# Patient Record
Sex: MALE | Race: WHITE
[De-identification: names, ages, dates, MRNs, and addresses within clinical notes are randomized per-mention and may not be internally consistent; named-entity substitution may affect disease eponyms.]

---

## 2020-06-22 ENCOUNTER — HOSPITAL ENCOUNTER (INPATIENT)
Dept: HOSPITAL 11 - JP.ED | Age: 55
LOS: 4 days | Discharge: HOME | DRG: 192 | End: 2020-06-26
Attending: INTERNAL MEDICINE | Admitting: INTERNAL MEDICINE
Payer: MEDICAID

## 2020-06-22 DIAGNOSIS — Z79.51: ICD-10-CM

## 2020-06-22 DIAGNOSIS — F17.210: ICD-10-CM

## 2020-06-22 DIAGNOSIS — Z71.6: ICD-10-CM

## 2020-06-22 DIAGNOSIS — J44.1: Primary | ICD-10-CM

## 2020-06-22 RX ADMIN — METHYLPREDNISOLONE SODIUM SUCCINATE SCH MG: 40 INJECTION, POWDER, FOR SOLUTION INTRAMUSCULAR; INTRAVENOUS at 17:48

## 2020-06-22 RX ADMIN — METHYLPREDNISOLONE SODIUM SUCCINATE SCH MG: 40 INJECTION, POWDER, FOR SOLUTION INTRAMUSCULAR; INTRAVENOUS at 11:30

## 2020-06-22 NOTE — CR
CHEST: Portable 6/22/2020 at 634

 

CLINICAL HISTORY:SOB

 

COMPARISON:None

 

FINDINGS:  The lungs are emphysematous. The heart size, pulmonary vascularity

and hilar structures are normal. No infiltrate effusion or pneumothorax is seen.

 

IMPRESSION: No acute cardiopulmonary process.

 

The [facility] changes

## 2020-06-22 NOTE — PCM.HP.2
H&P History of Present Illness





- General


Date of Service: 06/22/20


Admit Problem/Dx: 


                           Admission Diagnosis/Problem





Admission Diagnosis/Problem      COPD, Severe chronic obstructive pulmonary


                                 disease








Source of Information: Patient, Provider, RN Notes Reviewed


History Limitations: Reports: No Limitations





- History of Present Illness


Initial Comments - Free Text/Narative: 





Mr. Rico is a 55-year-old gentleman who was admitted through the emergency 

department with shortness of breath and hypoxia secondary to COPD exacerbation. 

He reports that he has had a lifelong history of asthma and does use an inhaler 

intermittently.  He has a 40-pack-year smoking history and smoked up until the 

day prior to admission.  He does find that he has to pace himself somewhat as 

far as physical activity because of shortness of breath, but is fairly 

physically active working as a lyle.  He does not use home oxygen.  He felt

well until 2 days ago and over the past 36 hours has developed progressive 

shortness of breath.  He presented to the emergency department early this 

morning with increased respiratory rate and hypoxia.  He has received nebulizer 

therapy and is feeling somewhat improved, adequate oxygenation on 1 L of oxygen 

via nasal cannula.  He denies any fevers chills sweats or productive cough.





- Related Data


Allergies/Adverse Reactions: 


                                    Allergies











Allergy/AdvReac Type Severity Reaction Status Date / Time


 


No Known Allergies Allergy   Verified 06/22/20 06:14











Home Medications: 


                                    Home Meds





Albuterol [Ventolin HFA] 1 - 2 puff .XX Q4H PRN 06/22/20 [History]











Past Medical History


Respiratory History: Reports: Asthma





- Past Surgical History


GI Surgical History: Reports: Hernia, Inguinal





Social & Family History





- Tobacco Use


Smoking Status *Q: Current Every Day Smoker


Years of Tobacco use: 27


Packs/Tins Daily: 1





- Recreational Drug Use


Recreational Drug Use: No





H&P Review of Systems





- Review of Systems:


Review Of Systems: See Below


General: Reports: No Symptoms


HEENT: Reports: No Symptoms


Pulmonary: Reports: Shortness of Breath, Wheezing.  Denies: Pleuritic Chest 

Pain, Cough, Sputum, Hemoptysis


Cardiovascular: Reports: Dyspnea on Exertion.  Denies: Chest Pain, Palpitations,

 Orthopnea, PND, Edema, Lightheadedness


Gastrointestinal: Reports: No Symptoms


Genitourinary: Reports: No Symptoms


Musculoskeletal: Reports: No Symptoms


Skin: Reports: No Symptoms


Psychiatric: Reports: No Symptoms


Neurological: Reports: No Symptoms


Hematologic/Lymphatic: Reports: No Symptoms


Immunologic: Reports: No Symptoms





Exam





- Exam


Exam: See Below





- Vital Signs


Vital Signs: 


                                Last Vital Signs











Temp  95.9 F L  06/22/20 06:04


 


Pulse  64   06/22/20 09:30


 


Resp  20   06/22/20 09:30


 


BP  134/89   06/22/20 09:30


 


Pulse Ox  98   06/22/20 09:30











Weight: 148 lb





- Exam


Quality Assessment: Supplemental Oxygen, DVT Prophylaxis


General: Alert, Oriented, Cooperative, Moderate Distress


HEENT: Conjunctiva Clear, Hearing Intact, Mucosa Moist & Pink, Normal Nasal 

Septum, Posterior Pharynx Clear, Pupils Equal


Neck: Supple, Trachea Midline, +2 Carotid Pulse wo Bruit


Lungs: Decreased Breath Sounds, Wheezing.  No: Crackles, Rales, Rhonchi


Cardiovascular: Regular Rate, Regular Rhythm, Normal S1, Normal S2.  No: 

Systolic Murmur, Diastolic Murmur


GI/Abdominal Exam: Soft, Non-Tender, No Organomegaly, No Distention


Back Exam: Normal Inspection, Full Range of Motion


Extremities: Non-Tender, No Pedal Edema


Skin: Warm, Dry, Intact, Other (Lipoma right posterior shoulder)


Neurological: Cranial Nerves Intact, Strength Equal Bilateral, Normal Speech, 

Normal Tone, Sensation Intact.  No: Focal Deficit


Neuro Extensive - Mental Status: Alert, Oriented x3, Normal Mood/Affect, Normal 

Cognition, Memory Intact





- Patient Data


Lab Results Last 24 hrs: 


                         Laboratory Results - last 24 hr











  06/22/20 06/22/20 06/22/20 Range/Units





  06:10 06:10 06:10 


 


WBC  14.4 H    (4.5-11.0)  K/uL


 


RBC  4.99    (4.30-5.90)  M/uL


 


Hgb  15.2 H    (12.0-15.0)  g/dL


 


Hct  46.4    (40.0-54.0)  %


 


MCV  93    (80-98)  fL


 


MCH  31    (27-31)  pg


 


MCHC  33    (32-36)  %


 


Plt Count  383    (150-400)  K/uL


 


Puncture Site    R brachial  


 


ABG pH    7.341 L  (7.350-7.450)  


 


ABG pCO2    42.9 H  (35.0-42.0)  mmHg


 


ABG pO2    141.0 H  (75.0-100.0)  mmHg


 


ABG HCO3    22.5  (22.0-26.0)  mmol/L


 


ABG Total CO2    19.6 L  (23.0-27.0)  mmol/L


 


ABG O2 Saturation    98.4 H  (95.0-98.0)  %


 


ABG O2 Content    21.8  (15.0-23.0)  %vol


 


ABG Base Excess    -2.7  mm/L


 


ABG Hemoglobin    15.9  (13.5-18.0)  g/dL


 


ABG Oxyhemoglobin    96.9  %


 


ABG Carboxyhemoglobin    0.8  (0.0-1.6)  %


 


ABG Methemoglobin    0.7  %


 


O2 Delivery Device    Bipap  


 


Oxygen Flow Rate    8.0  L


 


Sodium   139 L   (140-148)  mmol/L


 


Potassium   4.5   (3.6-5.2)  mmol/L


 


Chloride   105   (100-108)  mmol/L


 


Carbon Dioxide   24   (21-32)  mmol/L


 


Anion Gap   14.5 H   (5.0-14.0)  mmol/L


 


BUN   13   (7-18)  mg/dL


 


Creatinine   0.9   (0.8-1.3)  mg/dL


 


Est Cr Clr Drug Dosing   90.13   mL/min


 


Estimated GFR (MDRD)   > 60   (>60)  


 


Glucose   126 H   ()  mg/dL


 


Calcium   9.0   (8.5-10.1)  mg/dL











Result Diagrams: 


                                 06/22/20 06:10





                                 06/22/20 06:10





Sepsis Event Note





- Evaluation


Sepsis Screening Result: No Definite Risk





- Focused Exam


Vital Signs: 


                                   Vital Signs











  Temp Pulse Resp BP Pulse Ox


 


 06/22/20 09:30   64  20  134/89  98


 


 06/22/20 07:30   87  22 H  135/95 H  99


 


 06/22/20 06:28   58 L  24 H  160/103 H  100


 


 06/22/20 06:04  95.9 F L  75  21 H  157/96 H  100











Date Exam was Performed: 06/22/20


Time Exam was Performed: 10:03





*Q Meaningful Use (ADM)





- VTE Risk Assess *Q


Each Risk Factor Represents 1 Point: Age 41 - 59 years, Abnormal Pulmonary 

Function (COPD)


Total Score 1 Point Risk Factors: 2


Each Risk Factor Represents 2 Points: None


Total Score 2 Point Risk Factors: 0


Each Risk Factor Represents 3 Points: None


Total Score 3 Point Risk Factors: 0


Each Risk Factor Represents 5 Points: None


Total Score 5 Point Risk Factors: 0


Venous Thromboembolism Risk Factor Score *Q: 2


Problem List Initiated/Reviewed/Updated: Yes


Orders Last 24hrs: 


                               Active Orders 24 hr











 Category Date Time Status


 


 Patient Status Manage Transfer [TRANSFER] Routine ADT  06/22/20 09:57 Ordered


 


 RT Aerosol Therapy [RC] ASDIRECTED Care  06/22/20 06:06 Active


 


 RT Aerosol Therapy [RC] ASDIRECTED Care  06/22/20 06:40 Active


 


 Sodium Chloride 0.9% [Saline Flush] Med  06/22/20 06:11 Active





 10 ml FLUSH ASDIRECTED PRN   


 


 Saline Lock Insert [OM.PC] Routine Oth  06/22/20 06:11 Ordered


 


 Resuscitation Status Routine Resus Stat  06/22/20 09:59 Ordered








                                Medication Orders





Sodium Chloride (Saline Flush)  10 ml FLUSH ASDIRECTED PRN


   PRN Reason: Keep Vein Open


   Last Admin: 06/22/20 06:16  Dose: 10 ml


   Documented by: JOSE








Assessment/Plan Comment:: 





ASSESSMENT AND PLAN





COPD EXACERBATION WITH HYPOXIA-longstanding history of reactive airway disease 

and 40-pack-year smoking history.  Shortness of breath over the last 36 hours, 

no evidence of underlying infection.


-Supplemental oxygen as needed


-Nebulized albuterol and DuoNebs


-Solu-Medrol 40 mg IV every 6 hours


-Hold on antibiotic therapy, unless he develops overt evidence of infection.





NICOTINE DEPENDENCE


-Nicotine patch


-Nicotine gum as needed





MAINTENANCE ISSUES


-DVT prophylaxis; Lovenox 40 mg subcu daily


-GI prophylaxis; not indicated


-English catheter; not indicated


-Nutrition; regular diet


-Nicotine dependence; nicotine patch and gum





CODE STATUS-FULL CODE





ADMISSION STATUS-patient will be admitted to inpatient status, expect at least a

 2 night hospital stay for evaluation and management of problems as outlined 

above.  At the time of this admission I do not reasonably expected evaluation 

and management of this problem will require more than a 96 hour hospital stay.





DISPOSITION-anticipate discharge to home after the hospital stay.





- Mortality Measure


Prognosis:: Good

## 2020-06-22 NOTE — EDM.PDOC
ED HPI GENERAL MEDICAL PROBLEM





- General


Chief Complaint: Respiratory Problem


Stated Complaint: MEDICAL VIA NORTH


Time Seen by Provider: 06/22/20 06:05


Source of Information: Reports: Patient, EMS


History Limitations: Reports: Other (no old records)





- History of Present Illness


INITIAL COMMENTS - FREE TEXT/NARRATIVE: 





54 yo male smoker is transported this morning to the ER via EMS for 

SOB/wheezing. A Duoneb and BiPAP was used per EMS en route with some benefit. 

Comes from the [facility]. Has no provider. No fever. Has been hospitalized in 

the past for his breathing in [place], [place]. Has had MDI inhalers in the 

past, but is out of them. 


Onset: Gradual


Onset Date: 06/20/20


Duration: Day(s): (2), Getting Worse


Location: Reports: Chest


Quality: Reports: Other (no pain)


Severity: Severe


Improves with: Reports: Medication


Worsens with: Reports: Other (time/smoking)


Context: Reports: Other (See HPI)


Associated Symptoms: Reports: Shortness of Breath.  Denies: Chest Pain, 

Fever/Chills


Treatments PTA: Reports: Other (see below) (Duoneb/BiPAP)





- Related Data


                                    Allergies











Allergy/AdvReac Type Severity Reaction Status Date / Time


 


No Known Allergies Allergy   Verified 06/22/20 06:14











Home Meds: 


                                    Home Meds





Albuterol [Ventolin HFA] 1 - 2 puff .XX Q4H PRN 06/22/20 [History]











ED ROS GENERAL





- Review of Systems


Review Of Systems: See Below


Constitutional: Reports: No Symptoms


HEENT: Reports: No Symptoms


Respiratory: Reports: Shortness of Breath, Wheezing


Cardiovascular: Reports: No Symptoms


GI/Abdominal: Reports: No Symptoms


: Reports: No Symptoms


Musculoskeletal: Reports: No Symptoms


Skin: Reports: No Symptoms


Neurological: Reports: No Symptoms





ED EXAM, GENERAL





- Physical Exam


Exam: See Below


Exam Limited By: No Limitations


General Appearance: Alert, WD/WN, Mild Distress


Eye Exam: Bilateral Eye: Normal Inspection


Ears: Normal External Exam, Normal Canal, Hearing Grossly Normal


Ear Exam: Bilateral Ear: Auricle Normal, Canal Normal


Nose: Normal Inspection, No Blood


Throat/Mouth: Normal Inspection, Normal Lips, Normal Oropharynx, Normal Voice, 

No Airway Compromise


Head: Atraumatic, Normocephalic


Neck: Normal Inspection


Respiratory/Chest: Respiratory Distress, Decreased Breath Sounds, Wheezing, 

Retractions.  No: No Respiratory Distress, Lungs Clear, Normal Breath Sounds, No

 Accessory Muscle Use, Rales, Rhonchi


Cardiovascular: Regular Rate, Rhythm, No Edema


GI/Abdominal: Normal Bowel Sounds, Soft, Non-Tender, No Distention


Back Exam: Normal Inspection.  No: CVA Tenderness (R), CVA Tenderness (L)


Extremities: Normal Inspection, Normal Range of Motion, Non-Tender, No Pedal 

Edema.  No: Pedal Edema


Neurological: Alert, Oriented, CN II-XII Intact, Normal Cognition, No Motor/

Sensory Deficits


Psychiatric: Normal Affect, Normal Mood


Skin Exam: Warm, Dry, Intact, Normal Color, No Rash





Course





- Vital Signs


Text/Narrative:: 





Dr. Kelley called @ 0702h


Last Recorded V/S: 


                                Last Vital Signs











Temp  36.5 C   06/22/20 10:37


 


Pulse  84   06/22/20 16:00


 


Resp  20   06/22/20 16:00


 


BP  127/73   06/22/20 16:00


 


Pulse Ox  99   06/22/20 16:00














- Orders/Labs/Meds


Orders: 


                                Medication Orders





Acetaminophen (Tylenol)  650 mg PO Q4H PRN


   PRN Reason: Pain (Mild 1-3)/fever


Albuterol (Proventil Neb Soln)  2.5 mg NEB Q2H PRN


   PRN Reason: Shortness Of Breath/wheezing


Albuterol/Ipratropium (Duoneb 3.0-0.5 Mg/3 Ml)  3 ml NEB QIDRT [facility]


   Last Admin: 06/22/20 14:46  Dose: 3 ml


   Documented by: NEGAR


   Admin: 06/22/20 10:51  Dose: 3 ml


   Documented by: RAIMUNDO


Enoxaparin Sodium (Lovenox)  40 mg SUBCUT Q24H [facility]


   Last Admin: 06/22/20 11:27  Dose: 40 mg


   Documented by: PJ


Sodium Chloride (Normal Saline)  1,000 mls @ 125 mls/hr IV ASDIRECTED [facility]


   Last Admin: 06/22/20 10:44  Dose: 125 mls/hr


   Documented by: PJ


Methylprednisolone Sodium Succinate (Solu-Medrol)  40 mg IVPUSH Q6H [facility]


   Last Admin: 06/22/20 17:48  Dose: 40 mg


   Documented by: PJ


   Admin: 06/22/20 11:30  Dose: 40 mg


   Documented by: PJ


Nicotine (Habitrol)  21 mg TRDERM DAILY DARWIN


   Last Admin: 06/22/20 11:28  Dose: 21 mg


   Documented by: PJ


Nicotine Polacrilex (Nicorelief)  2 mg CHEW Q1H PRN


   PRN Reason: Other


Ondansetron HCl (Zofran)  4 mg IV Q4H PRN


   PRN Reason: Nausea/Vomiting


Polyethylene Glycol (Miralax)  17 gm PO DAILY PRN


   PRN Reason: Constipation


Sodium Chloride (Saline Flush)  10 ml FLUSH ASDIRECTED PRN


   PRN Reason: Keep Vein Open








Labs: 


                                Laboratory Tests











  06/22/20 06/22/20 06/22/20 Range/Units





  06:10 06:10 06:10 


 


WBC  14.4 H    (4.5-11.0)  K/uL


 


RBC  4.99    (4.30-5.90)  M/uL


 


Hgb  15.2 H    (12.0-15.0)  g/dL


 


Hct  46.4    (40.0-54.0)  %


 


MCV  93    (80-98)  fL


 


MCH  31    (27-31)  pg


 


MCHC  33    (32-36)  %


 


Plt Count  383    (150-400)  K/uL


 


Puncture Site    R brachial  


 


ABG pH    7.341 L  (7.350-7.450)  


 


ABG pCO2    42.9 H  (35.0-42.0)  mmHg


 


ABG pO2    141.0 H  (75.0-100.0)  mmHg


 


ABG HCO3    22.5  (22.0-26.0)  mmol/L


 


ABG Total CO2    19.6 L  (23.0-27.0)  mmol/L


 


ABG O2 Saturation    98.4 H  (95.0-98.0)  %


 


ABG O2 Content    21.8  (15.0-23.0)  %vol


 


ABG Base Excess    -2.7  mm/L


 


ABG Hemoglobin    15.9  (13.5-18.0)  g/dL


 


ABG Oxyhemoglobin    96.9  %


 


ABG Carboxyhemoglobin    0.8  (0.0-1.6)  %


 


ABG Methemoglobin    0.7  %


 


O2 Delivery Device    Bipap  


 


Oxygen Flow Rate    8.0  L


 


Sodium   139 L   (140-148)  mmol/L


 


Potassium   4.5   (3.6-5.2)  mmol/L


 


Chloride   105   (100-108)  mmol/L


 


Carbon Dioxide   24   (21-32)  mmol/L


 


Anion Gap   14.5 H   (5.0-14.0)  mmol/L


 


BUN   13   (7-18)  mg/dL


 


Creatinine   0.9   (0.8-1.3)  mg/dL


 


Est Cr Clr Drug Dosing   90.13   mL/min


 


Estimated GFR (MDRD)   > 60   (>60)  


 


Glucose   126 H   ()  mg/dL


 


Calcium   9.0   (8.5-10.1)  mg/dL











Meds: 


Medications











Generic Name Dose Route Start Last Admin





  Trade Name Freq  PRN Reason Stop Dose Admin


 


Acetaminophen  650 mg  06/22/20 10:37 





  Tylenol  PO  





  Q4H PRN  





  Pain (Mild 1-3)/fever  


 


Albuterol  2.5 mg  06/22/20 10:37 





  Proventil Neb Soln  NEB  





  Q2H PRN  





  Shortness Of Breath/wheezing  


 


Albuterol/Ipratropium  3 ml  06/22/20 11:00  06/22/20 14:46





  Duoneb 3.0-0.5 Mg/3 Ml  NEB   3 ml





  QIDRT DARWIN   Administration


 


Enoxaparin Sodium  40 mg  06/22/20 12:00  06/22/20 11:27





  Lovenox  SUBCUT   40 mg





  Q24H DARWIN   Administration


 


Sodium Chloride  1,000 mls @ 125 mls/hr  06/22/20 10:37  06/22/20 10:44





  Normal Saline  IV   125 mls/hr





  ASDIRECTED DARWIN   Administration


 


Methylprednisolone Sodium Succinate  40 mg  06/22/20 12:00  06/22/20 17:48





  Solu-Medrol  IVPUSH   40 mg





  Q6H DARWIN   Administration


 


Nicotine  21 mg  06/22/20 11:00  06/22/20 11:28





  Habitrol  TRDERM   21 mg





  DAILY DARWIN   Administration


 


Nicotine Polacrilex  2 mg  06/22/20 10:37 





  Nicorelief  CHEW  





  Q1H PRN  





  Other  


 


Ondansetron HCl  4 mg  06/22/20 10:37 





  Zofran  IV  





  Q4H PRN  





  Nausea/Vomiting  


 


Polyethylene Glycol  17 gm  06/22/20 10:37 





  Miralax  PO  





  DAILY PRN  





  Constipation  


 


Sodium Chloride  10 ml  06/22/20 10:37 





  Saline Flush  FLUSH  





  ASDIRECTED PRN  





  Keep Vein Open  














Discontinued Medications














Generic Name Dose Route Start Last Admin





  Trade Name Freq  PRN Reason Stop Dose Admin


 


Albuterol  2.5 mg  06/22/20 06:06  06/22/20 06:13





  Proventil Neb Soln  NEB  06/22/20 06:07  2.5 mg





  ONETIME ONE   Administration


 


Albuterol  2.5 mg  06/22/20 06:40  06/22/20 06:44





  Proventil Neb Soln  NEB  06/22/20 06:41  2.5 mg





  ONETIME ONE   Administration


 


Methylprednisolone Sodium Succinate  125 mg  06/22/20 06:12  06/22/20 06:16





  Solu-Medrol  IVPUSH  06/22/20 06:13  125 mg





  ONETIME ONE   Administration


 


Sodium Chloride  10 ml  06/22/20 06:11  06/22/20 06:16





  Saline Flush  FLUSH   10 ml





  ASDIRECTED PRN   Administration





  Keep Vein Open  














- Radiology Interpretation


Free Text/Narrative:: 





CXR-emphysema





Departure





- Departure


Time of Disposition: 08:00


Disposition: Admitted As Inpatient 68


Condition: Fair


Clinical Impression: 


 Bronchospasm





Emphysema lung


Qualifiers:


 Emphysema type: unspecified Qualified Code(s): J43.9 - Emphysema, unspecified








- Discharge Information


*PRESCRIPTION DRUG MONITORING PROGRAM REVIEWED*: Not Applicable


*COPY OF PRESCRIPTION DRUG MONITORING REPORT IN PATIENT JOSE ALFREDO: Not Applicable





Sepsis Event Note (ED)





- Focused Exam


Vital Signs: 


                                   Vital Signs











  Temp Pulse Resp BP Pulse Ox


 


 06/22/20 09:30   64  20  134/89  98


 


 06/22/20 07:30   87  22 H  135/95 H  99


 


 06/22/20 06:28   58 L  24 H  160/103 H  100


 


 06/22/20 06:04  35.5 C L  75  21 H  157/96 H  100

## 2020-06-23 RX ADMIN — ALBUTEROL SULFATE PRN MG: 2.5 SOLUTION RESPIRATORY (INHALATION) at 04:46

## 2020-06-23 RX ADMIN — METHYLPREDNISOLONE SODIUM SUCCINATE SCH MG: 40 INJECTION, POWDER, FOR SOLUTION INTRAMUSCULAR; INTRAVENOUS at 00:38

## 2020-06-23 RX ADMIN — METHYLPREDNISOLONE SODIUM SUCCINATE SCH MG: 40 INJECTION, POWDER, FOR SOLUTION INTRAMUSCULAR; INTRAVENOUS at 17:09

## 2020-06-23 RX ADMIN — METHYLPREDNISOLONE SODIUM SUCCINATE SCH MG: 40 INJECTION, POWDER, FOR SOLUTION INTRAMUSCULAR; INTRAVENOUS at 11:32

## 2020-06-23 RX ADMIN — METHYLPREDNISOLONE SODIUM SUCCINATE SCH MG: 40 INJECTION, POWDER, FOR SOLUTION INTRAMUSCULAR; INTRAVENOUS at 06:08

## 2020-06-23 NOTE — PCM.PN
- General Info


Date of Service: 06/23/20


Subjective Update: 





Mr. Rico is noted moderate improvement in his shortness of breath since 

admission.  At rest he is much less short of breath and more comfortable.  He 

does desaturate and become short of breath with minimal exertion.  He is 

remained afebrile and hemodynamically stable, no cough or other evidence of 

underlying infection.


Functional Status: Reports: Tolerating Diet, Urinating





- Review of Systems


General: Reports: No Symptoms


Pulmonary: Reports: Shortness of Breath, Wheezing.  Denies: Pleuritic Chest 

Pain, Cough, Sputum, Hemoptysis


Cardiovascular: Reports: Dyspnea on Exertion.  Denies: Chest Pain, Palpitations,

Orthopnea, PND, Edema, Lightheadedness


Gastrointestinal: Reports: No Symptoms





- Patient Data


Vitals - Most Recent: 


                                Last Vital Signs











Temp  98.2 F   06/23/20 08:00


 


Pulse  93   06/23/20 10:00


 


Resp  18   06/23/20 10:00


 


BP  114/60   06/23/20 10:00


 


Pulse Ox  92 L  06/23/20 10:00











Weight - Most Recent: 148 lb 0.011 oz


I&O - Last 24 Hours: 


                                 Intake & Output











 06/22/20 06/23/20 06/23/20





 22:59 06:59 14:59


 


Intake Total 1269 1348 250


 


Output Total 450 900 450


 


Balance 819 448 -200











Med Orders - Current: 


                               Current Medications





Acetaminophen (Tylenol)  650 mg PO Q4H PRN


   PRN Reason: Pain (Mild 1-3)/fever


Albuterol (Proventil Neb Soln)  2.5 mg NEB Q2H PRN


   PRN Reason: Shortness Of Breath/wheezing


   Last Admin: 06/23/20 04:46 Dose:  2.5 mg


   Documented by: 


Albuterol/Ipratropium (Duoneb 3.0-0.5 Mg/3 Ml)  3 ml NEB QIDRT [facility]


   Last Admin: 06/23/20 07:25 Dose:  3 ml


   Documented by: 


Enoxaparin Sodium (Lovenox)  40 mg SUBCUT Q24H [facility]


   Last Admin: 06/22/20 11:27 Dose:  40 mg


   Documented by: 


Methylprednisolone Sodium Succinate (Solu-Medrol)  40 mg IVPUSH Q6H [facility]


   Last Admin: 06/23/20 06:08 Dose:  40 mg


   Documented by: 


Nicotine (Habitrol)  21 mg TRDERM DAILY [facility]


   Last Admin: 06/23/20 08:56 Dose:  21 mg


   Documented by: 


Nicotine Polacrilex (Nicorelief)  2 mg CHEW Q1H PRN


   PRN Reason: Other


Ondansetron HCl (Zofran)  4 mg IV Q4H PRN


   PRN Reason: Nausea/Vomiting


Polyethylene Glycol (Miralax)  17 gm PO DAILY PRN


   PRN Reason: Constipation


Sodium Chloride (Saline Flush)  10 ml FLUSH ASDIRECTED PRN


   PRN Reason: Keep Vein Open





Discontinued Medications





Albuterol (Proventil Neb Soln)  2.5 mg NEB ONETIME ONE


   Stop: 06/22/20 06:07


   Last Admin: 06/22/20 06:13 Dose:  2.5 mg


   Documented by: 


Albuterol (Proventil Neb Soln)  2.5 mg NEB ONETIME ONE


   Stop: 06/22/20 06:41


   Last Admin: 06/22/20 06:44 Dose:  2.5 mg


   Documented by: 


Sodium Chloride (Normal Saline)  1,000 mls @ 125 mls/hr IV ASDIRECTED [facility]


   Last Admin: 06/23/20 02:20 Dose:  125 mls/hr


   Documented by: 


Methylprednisolone Sodium Succinate (Solu-Medrol)  125 mg IVPUSH ONETIME ONE


   Stop: 06/22/20 06:13


   Last Admin: 06/22/20 06:16 Dose:  125 mg


   Documented by: 


Sodium Chloride (Saline Flush)  10 ml FLUSH ASDIRECTED PRN


   PRN Reason: Keep Vein Open


   Last Admin: 06/22/20 06:16 Dose:  10 ml


   Documented by: 











- Exam


Quality Assessment: DVT Prophylaxis


General: Alert, Oriented, Cooperative, Moderate Distress


Lungs: Decreased Breath Sounds, Wheezing.  No: Crackles, Rales, Rhonchi


Cardiovascular: Regular Rate, Regular Rhythm, No Murmurs


GI/Abdominal Exam: Soft, Non-Tender, No Organomegaly, No Distention


Extremities: Non-Tender, No Pedal Edema





Sepsis Event Note





- Evaluation


Sepsis Screening Result: No Definite Risk





- Focused Exam


Vital Signs: 


                                   Vital Signs











  Temp Pulse Resp BP Pulse Ox Pulse Ox


 


 06/23/20 10:00   93  18  114/60  92 L 


 


 06/23/20 08:00  98.2 F  100  20  127/66  94 L 


 


 06/23/20 07:25   105 H     95


 


 06/23/20 07:00      96 


 


 06/23/20 06:00   81  15  125/63  96 


 


 06/23/20 04:00   76  16  112/73  96 


 


 06/23/20 02:00   92  16  116/65  91 L 


 


 06/23/20 00:00   89  18  125/75  95 











Date Exam was Performed: 06/23/20


Time Exam was Performed: 10:29





- Problem List Review


Problem List Initiated/Reviewed/Updated: Yes





- My Orders


Last 24 Hours: 


My Active Orders





06/22/20 09:59


Resuscitation Status Routine 





06/22/20 10:37


Acetaminophen [Tylenol]   650 mg PO Q4H PRN 


Albuterol [Proventil Neb Soln]   2.5 mg NEB Q2H PRN 


Nicotine Polacrilex [Nicorelief]   2 mg CHEW Q1H PRN 


Ondansetron [Zofran]   4 mg IV Q4H PRN 


Sodium Chloride 0.9% [Saline Flush]   10 ml FLUSH ASDIRECTED PRN 


polyethylene glycoL 3350 [MiraLAX]   17 gm PO DAILY PRN 





06/22/20 10:37


Patient Status [ADT] Routine 


Ambulate [RC] QID 


Height and Weight [RC] DAILY 


Intake and Output [RC] QSHIFT 


Notify Provider Vital Signs [RC] ASDIRECTED 


Oxygen Therapy [RC] PRN 


Peripheral IV Care [RC] Q12H 


Pulse Oximetry [RC] CONTINUOUS 


RT Aerosol Therapy [RC] ASDIRECTED 


Up ad Pilar [RC] ASDIRECTED 


Up to Chair [RC] QID 


VTE/DVT Education [RC] Per Unit Routine 


Vital Signs [RC] Q2H 


Peripheral IV Insertion Adult [OM.PC] Routine 





06/22/20 11:00


Albuterol/Ipratropium [DuoNeb 3.0-0.5 MG/3 ML]   3 ml NEB QIDRT 


Nicotine [Habitrol]   21 mg TRDERM DAILY 





06/22/20 12:00


Enoxaparin [Lovenox]   40 mg SUBCUT Q24H 


methylPREDNISolone Sod Succ [Solu-MEDROL]   40 mg IVPUSH Q6H 





06/23/20 10:28


Convert IV to Saline Lock [OM.PC] Routine 














- Plan


Plan:: 





ASSESSMENT AND PLAN





COPD EXACERBATION WITH HYPOXIA-longstanding history of reactive airway disease 

and 40-pack-year smoking history.  Moderate improvement from admission, still 

short of breath and hypoxic with activity


-Supplemental oxygen as needed


-Nebulized albuterol and DuoNebs


-Solu-Medrol 40 mg IV every 6 hours


-Hold on antibiotic therapy, unless he develops evidence of infection.





NICOTINE DEPENDENCE


-Nicotine patch


-Nicotine gum as needed





MAINTENANCE ISSUES


-DVT prophylaxis; Lovenox 40 mg subcu daily


-GI prophylaxis; not indicated


-English catheter; not indicated


-Nutrition; regular diet


-Nicotine dependence; nicotine patch and gum





CODE STATUS-FULL CODE





ADMISSION STATUS-patient will be admitted to inpatient status, expect at least a

 2 night hospital stay for evaluation and management of problems as outlined 

above.  At the time of this admission I do not reasonably expected evaluation 

and management of this problem will require more than a 96 hour hospital stay.





DISPOSITION-anticipate discharge to home after the hospital stay.

## 2020-06-24 RX ADMIN — METHYLPREDNISOLONE SODIUM SUCCINATE SCH MG: 40 INJECTION, POWDER, FOR SOLUTION INTRAMUSCULAR; INTRAVENOUS at 13:05

## 2020-06-24 RX ADMIN — METHYLPREDNISOLONE SODIUM SUCCINATE SCH MG: 40 INJECTION, POWDER, FOR SOLUTION INTRAMUSCULAR; INTRAVENOUS at 17:27

## 2020-06-24 RX ADMIN — ALBUTEROL SULFATE PRN MG: 2.5 SOLUTION RESPIRATORY (INHALATION) at 05:28

## 2020-06-24 RX ADMIN — METHYLPREDNISOLONE SODIUM SUCCINATE SCH MG: 40 INJECTION, POWDER, FOR SOLUTION INTRAMUSCULAR; INTRAVENOUS at 01:50

## 2020-06-24 RX ADMIN — METHYLPREDNISOLONE SODIUM SUCCINATE SCH MG: 40 INJECTION, POWDER, FOR SOLUTION INTRAMUSCULAR; INTRAVENOUS at 05:29

## 2020-06-24 NOTE — PCM.PN
- General Info


Date of Service: 06/24/20


Subjective Update: 





Mr. Rico has noted further modest improvement in his shortness of breath 

over the last 24 hours.  Continues to experience a nonproductive cough.  Vital 

signs have remained stable and he has been afebrile.  Continues to require 

supplemental oxygen at 1 L/min via nasal cannula.


Functional Status: Reports: Tolerating Diet, Ambulating, Urinating





- Review of Systems


General: Reports: No Symptoms


Pulmonary: Reports: Shortness of Breath, Cough, Wheezing.  Denies: Pleuritic 

Chest Pain, Sputum, Hemoptysis


Cardiovascular: Reports: Dyspnea on Exertion.  Denies: Chest Pain, Palpitations,

Orthopnea, PND, Edema, Lightheadedness


Gastrointestinal: Reports: No Symptoms





- Patient Data


Vitals - Most Recent: 


                                Last Vital Signs











Temp  97.5 F   06/24/20 11:00


 


Pulse  90   06/24/20 11:04


 


Resp  18   06/24/20 11:00


 


BP  106/52 L  06/24/20 11:00


 


Pulse Ox  92 L  06/24/20 11:00











Weight - Most Recent: 134 lb 3.2 oz


I&O - Last 24 Hours: 


                                 Intake & Output











 06/23/20 06/24/20 06/24/20





 22:59 06:59 14:59


 


Intake Total 240 360 


 


Balance 240 360 











Med Orders - Current: 


                               Current Medications





Acetaminophen (Tylenol)  650 mg PO Q4H PRN


   PRN Reason: Pain (Mild 1-3)/fever


Albuterol (Proventil Neb Soln)  2.5 mg NEB Q2H PRN


   PRN Reason: Shortness Of Breath/wheezing


   Last Admin: 06/24/20 05:28 Dose:  2.5 mg


   Documented by: 


Albuterol/Ipratropium (Duoneb 3.0-0.5 Mg/3 Ml)  3 ml NEB QIDRT [facility]


   Last Admin: 06/24/20 11:04 Dose:  3 ml


   Documented by: 


Enoxaparin Sodium (Lovenox)  40 mg SUBCUT Q24H [facility]


   Last Admin: 06/23/20 11:32 Dose:  40 mg


   Documented by: 


Methylprednisolone Sodium Succinate (Solu-Medrol)  40 mg IVPUSH Q6H [facility]


   Last Admin: 06/24/20 05:29 Dose:  40 mg


   Documented by: 


Nicotine (Habitrol)  21 mg TRDERM DAILY [facility]


   Last Admin: 06/24/20 09:15 Dose:  21 mg


   Documented by: 


Nicotine Polacrilex (Nicorelief)  2 mg CHEW Q1H PRN


   PRN Reason: Other


Ondansetron HCl (Zofran)  4 mg IV Q4H PRN


   PRN Reason: Nausea/Vomiting


Polyethylene Glycol (Miralax)  17 gm PO DAILY PRN


   PRN Reason: Constipation


Sodium Chloride (Saline Flush)  10 ml FLUSH ASDIRECTED PRN


   PRN Reason: Keep Vein Open





Discontinued Medications





Albuterol (Proventil Neb Soln)  2.5 mg NEB ONETIME ONE


   Stop: 06/22/20 06:07


   Last Admin: 06/22/20 06:13 Dose:  2.5 mg


   Documented by: 


Albuterol (Proventil Neb Soln)  2.5 mg NEB ONETIME ONE


   Stop: 06/22/20 06:41


   Last Admin: 06/22/20 06:44 Dose:  2.5 mg


   Documented by: 


Sodium Chloride (Normal Saline)  1,000 mls @ 125 mls/hr IV ASDIRECTED [facility]


   Last Admin: 06/23/20 02:20 Dose:  125 mls/hr


   Documented by: 


Methylprednisolone Sodium Succinate (Solu-Medrol)  125 mg IVPUSH ONETIME ONE


   Stop: 06/22/20 06:13


   Last Admin: 06/22/20 06:16 Dose:  125 mg


   Documented by: 


Sodium Chloride (Saline Flush)  10 ml FLUSH ASDIRECTED PRN


   PRN Reason: Keep Vein Open


   Last Admin: 06/22/20 06:16 Dose:  10 ml


   Documented by: 











- Exam


Quality Assessment: Supplemental Oxygen, DVT Prophylaxis


General: Alert, Oriented, Cooperative, Moderate Distress


Lungs: Decreased Breath Sounds, Wheezing.  No: Crackles, Rales, Rhonchi


Cardiovascular: Regular Rate, Regular Rhythm.  No: No Murmurs


GI/Abdominal Exam: Soft, Non-Tender, No Organomegaly, No Distention


Extremities: Non-Tender, No Pedal Edema





Sepsis Event Note





- Evaluation


Sepsis Screening Result: No Definite Risk





- Focused Exam


Vital Signs: 


                                   Vital Signs











  Temp Pulse Resp BP Pulse Ox


 


 06/24/20 11:04   90   


 


 06/24/20 11:00  97.5 F  96  18  106/52 L  92 L


 


 06/24/20 07:47      91 L


 


 06/24/20 07:33  96.9 F  97  16  107/63  96


 


 06/24/20 07:26   72   


 


 06/24/20 03:00  97.5 F  67  18  121/61  95


 


 06/24/20 01:40      94 L











Date Exam was Performed: 06/24/20


Time Exam was Performed: 12:21





- Problem List Review


Problem List Initiated/Reviewed/Updated: Yes





- Plan


Plan:: 





ASSESSMENT AND PLAN





COPD EXACERBATION WITH HYPOXIA-longstanding history of reactive airway disease 

and 40-pack-year smoking history.  Moderate improvement from admission, still 

short of breath and hypoxic with activity


-Supplemental oxygen as needed


-Nebulized albuterol and DuoNebs


-Solu-Medrol 40 mg IV every 6 hours


-Hold on antibiotic therapy, unless he develops evidence of infection.





NICOTINE DEPENDENCE


-Nicotine patch


-Nicotine gum as needed





MAINTENANCE ISSUES


-DVT prophylaxis; Lovenox 40 mg subcu daily


-GI prophylaxis; not indicated


-English catheter; not indicated


-Nutrition; regular diet


-Nicotine dependence; nicotine patch and gum





CODE STATUS-FULL CODE





ADMISSION STATUS-patient will be admitted to inpatient status, expect at least a

2 night hospital stay for evaluation and management of problems as outlined 

above.  At the time of this admission I do not reasonably expected evaluation 

and management of this problem will require more than a 96 hour hospital stay.





DISPOSITION-anticipate discharge to home after the hospital stay.

## 2020-06-25 RX ADMIN — METHYLPREDNISOLONE SODIUM SUCCINATE SCH MG: 40 INJECTION, POWDER, FOR SOLUTION INTRAMUSCULAR; INTRAVENOUS at 11:24

## 2020-06-25 RX ADMIN — METHYLPREDNISOLONE SODIUM SUCCINATE SCH MG: 40 INJECTION, POWDER, FOR SOLUTION INTRAMUSCULAR; INTRAVENOUS at 00:33

## 2020-06-25 RX ADMIN — METHYLPREDNISOLONE SODIUM SUCCINATE SCH MG: 40 INJECTION, POWDER, FOR SOLUTION INTRAMUSCULAR; INTRAVENOUS at 21:09

## 2020-06-25 RX ADMIN — METHYLPREDNISOLONE SODIUM SUCCINATE SCH MG: 40 INJECTION, POWDER, FOR SOLUTION INTRAMUSCULAR; INTRAVENOUS at 05:10

## 2020-06-25 NOTE — PCM.PN
- General Info


Date of Service: 06/25/20


Subjective Update: 





Mr. Rico has been stable since yesterday, further improvement in shortness 

of breath although he does get winded with fairly minimal activity.  He is now 

off of supplemental oxygen.  Continues to have cough intermittently productive 

of fairly clear sputum.


Functional Status: Reports: Tolerating Diet, Ambulating, Urinating





- Review of Systems


General: Denies: Fever, Chills


Pulmonary: Reports: Shortness of Breath.  Denies: Pleuritic Chest Pain, Cough, 

Sputum, Hemoptysis, Wheezing


Cardiovascular: Reports: Dyspnea on Exertion.  Denies: Chest Pain, Palpitations,

Orthopnea, PND, Edema, Lightheadedness


Gastrointestinal: Reports: No Symptoms





- Patient Data


Vitals - Most Recent: 


                                Last Vital Signs











Temp  98.2 F   06/25/20 11:29


 


Pulse  97   06/25/20 11:29


 


Resp  16   06/25/20 11:29


 


BP  128/65   06/25/20 11:29


 


Pulse Ox  93 L  06/25/20 11:29











Weight - Most Recent: 134 lb 3.2 oz


I&O - Last 24 Hours: 


                                 Intake & Output











 06/24/20 06/25/20 06/25/20





 22:59 06:59 14:59


 


Intake Total 1200 480 


 


Balance 1200 480 











Med Orders - Current: 


                               Current Medications





Acetaminophen (Tylenol)  650 mg PO Q4H PRN


   PRN Reason: Pain (Mild 1-3)/fever


   Last Admin: 06/25/20 11:27 Dose:  650 mg


   Documented by: 


Albuterol (Proventil Neb Soln)  2.5 mg NEB Q2H PRN


   PRN Reason: Shortness Of Breath/wheezing


   Last Admin: 06/24/20 05:28 Dose:  2.5 mg


   Documented by: 


Albuterol/Ipratropium (Duoneb 3.0-0.5 Mg/3 Ml)  3 ml NEB QIDRT [facility]


   Last Admin: 06/25/20 10:46 Dose:  3 ml


   Documented by: 


Enoxaparin Sodium (Lovenox)  40 mg SUBCUT Q24H [facility]


   Last Admin: 06/25/20 11:24 Dose:  40 mg


   Documented by: 


Nicotine (Habitrol)  21 mg TRDERM DAILY [facility]


   Last Admin: 06/25/20 09:22 Dose:  Not Given


   Documented by: 


Nicotine Polacrilex (Nicorelief)  2 mg CHEW Q1H PRN


   PRN Reason: Other


Ondansetron HCl (Zofran)  4 mg IV Q4H PRN


   PRN Reason: Nausea/Vomiting


Polyethylene Glycol (Miralax)  17 gm PO DAILY PRN


   PRN Reason: Constipation


Sodium Chloride (Saline Flush)  10 ml FLUSH ASDIRECTED PRN


   PRN Reason: Keep Vein Open





Discontinued Medications





Albuterol (Proventil Neb Soln)  2.5 mg NEB ONETIME ONE


   Stop: 06/22/20 06:07


   Last Admin: 06/22/20 06:13 Dose:  2.5 mg


   Documented by: 


Albuterol (Proventil Neb Soln)  2.5 mg NEB ONETIME ONE


   Stop: 06/22/20 06:41


   Last Admin: 06/22/20 06:44 Dose:  2.5 mg


   Documented by: 


Sodium Chloride (Normal Saline)  1,000 mls @ 125 mls/hr IV ASDIRECTED [facility]


   Last Admin: 06/23/20 02:20 Dose:  125 mls/hr


   Documented by: 


Methylprednisolone Sodium Succinate (Solu-Medrol)  125 mg IVPUSH ONETIME ONE


   Stop: 06/22/20 06:13


   Last Admin: 06/22/20 06:16 Dose:  125 mg


   Documented by: 


Methylprednisolone Sodium Succinate (Solu-Medrol)  40 mg IVPUSH Q6H [facility]


   Last Admin: 06/25/20 11:24 Dose:  40 mg


   Documented by: 


Sodium Chloride (Saline Flush)  10 ml FLUSH ASDIRECTED PRN


   PRN Reason: Keep Vein Open


   Last Admin: 06/22/20 06:16 Dose:  10 ml


   Documented by: 











- Exam


Quality Assessment: DVT Prophylaxis


General: Alert, Oriented, Cooperative, Mild Distress


Lungs: Clear to Auscultation, Normal Respiratory Effort, Decreased Breath 

Sounds.  No: Rales, Rhonchi, Wheezing


Cardiovascular: Regular Rate, Regular Rhythm, No Murmurs


GI/Abdominal Exam: Soft, Non-Tender, No Organomegaly, No Distention


Extremities: Non-Tender, No Pedal Edema





Sepsis Event Note





- Evaluation


Sepsis Screening Result: No Definite Risk





- Focused Exam


Vital Signs: 


                                   Vital Signs











  Temp Pulse Resp BP Pulse Ox


 


 06/25/20 11:29  98.2 F  97  16  128/65  93 L


 


 06/25/20 10:47   81   


 


 06/25/20 08:08  97.2 F  92  16  115/53 L  93 L


 


 06/25/20 07:20      91 L


 


 06/25/20 07:17   84   


 


 06/25/20 04:09   81  18   93 L


 


 06/25/20 01:00      92 L











Date Exam was Performed: 06/25/20


Time Exam was Performed: 12:23





- Problem List Review


Problem List Initiated/Reviewed/Updated: Yes





- My Orders


Last 24 Hours: 


My Active Orders





06/24/20 15:42


RT Acapella [RESPCARE] Routine 





06/25/20 12:30


methylPREDNISolone Sod Succ [Solu-MEDROL]   40 mg IVPUSH Q12H 














- Plan


Plan:: 





ASSESSMENT AND PLAN





COPD EXACERBATION WITH HYPOXIA-longstanding history of reactive airway disease 

and 40-pack-year smoking history.  Moderate improvement from admission, still 

short of breath and hypoxic with activity.  Is now off of supplemental oxygen


-Supplemental oxygen as needed


-Nebulized albuterol and DuoNebs


-Solu-Medrol 40 mg IV every 12 hours


-Hold on antibiotic therapy, unless he develops evidence of infection.





NICOTINE DEPENDENCE


-Nicotine patch


-Nicotine gum as needed





MAINTENANCE ISSUES


-DVT prophylaxis; Lovenox 40 mg subcu daily


-GI prophylaxis; not indicated


-English catheter; not indicated


-Nutrition; regular diet


-Nicotine dependence; nicotine patch and gum





CODE STATUS-FULL CODE





ADMISSION STATUS-patient will be admitted to inpatient status, expect at least a

2 night hospital stay for evaluation and management of problems as outlined 

above.  At the time of this admission I do not reasonably expected evaluation 

and management of this problem will require more than a 96 hour hospital stay.





DISPOSITION-anticipate discharge to home after the hospital stay.

## 2020-06-26 RX ADMIN — METHYLPREDNISOLONE SODIUM SUCCINATE SCH MG: 40 INJECTION, POWDER, FOR SOLUTION INTRAMUSCULAR; INTRAVENOUS at 09:42

## 2020-06-26 NOTE — PCM.DCSUM1
**Discharge Summary





- Hospital Course


Brief History: Mr. Rico is a 55-year-old gentleman who was admitted through

the emergency department with shortness of breath and hypoxia secondary to COPD 

exacerbation.





- Discharge Data


Discharge Date: 06/26/20


Discharge Disposition: Home, Self-Care 01


Condition: Fair





- Referral to Home Health


Primary Care Physician: 


PCP None








- Discharge Diagnosis/Problem(s)


(1) COPD exacerbation


SNOMED Code(s): 246946525


   ICD Code: J44.1 - CHRONIC OBSTRUCTIVE PULMONARY DISEASE W (ACUTE) 

EXACERBATION   Status: Acute   Current Visit: Yes   





(2) Tobacco use disorder


SNOMED Code(s): 380379868


   ICD Code: F17.200 - NICOTINE DEPENDENCE, UNSPECIFIED, UNCOMPLICATED   Status:

Chronic   Current Visit: No   





- Patient Summary/Data


Hospital Course: 





Mr. Rico is a 55-year-old gentleman who was admitted through the emergency 

department with shortness of breath and hypoxia secondary to COPD exacerbation. 

He reports that he has had a lifelong history of asthma and does use an inhaler 

intermittently.  He has a 40-pack-year smoking history and smoked up until the 

day prior to admission.  He does find that he has to pace himself somewhat as 

far as physical activity because of shortness of breath, but is fairly 

physically active working as a lyle.  He does not use home oxygen.  He felt

well until 2 days ago and over the past 36 hours has developed progressive 

shortness of breath.  He presented to the emergency department early this 

morning with increased respiratory rate and hypoxia.  He has received nebulizer 

therapy and is feeling somewhat improved, adequate oxygenation on 1 L of oxygen 

via nasal cannula.  He denies any fevers chills sweats or productive cough.  

Initially he was given IV fluids for hydration, as well as ongoing nebulizer 

therapy scheduled and as needed.  He was given Solu-Medrol 40 mg IV every 6 

hours.  During hospital stay he did develop a cough which was minimally 

productive and he was felt to have had a probable viral upper respiratory tract 

infection.  He had no significant fevers or purulent sputum.  Respiratory status

gradually improved through hospitalization and by the time of discharge he was 

up and active with good oxygenation on room air.  He was counseled concerning 

the importance of nicotine cessation.  Activity will be as tolerated and he will

resume his usual diet.  He will receive an additional 3-day course of oral 

prednisone.  Follow-up appointment will be scheduled with primary care within 1 

week.





- Patient Instructions


Diet: Usual Diet as Tolerated


Activity: As Tolerated


Other/Special Instructions: Please schedule follow-up appointment with primary 

care provider within 1 week.





- Discharge Plan


*PRESCRIPTION DRUG MONITORING PROGRAM REVIEWED*: Not Applicable


*COPY OF PRESCRIPTION DRUG MONITORING REPORT IN PATIENT JOSE ALFREDO: Not Applicable


Prescriptions/Med Rec: 


predniSONE [Prednisone] 40 mg PO DAILY #6 tablet


Home Medications: 


                                    Home Meds





Albuterol [Ventolin HFA] 1 - 2 puff .XX Q4H PRN 06/22/20 [History]


predniSONE [Prednisone] 40 mg PO DAILY #6 tablet 06/26/20 [Rx]








Patient Handouts:  Chronic Obstructive Pulmonary Disease, Easy-to-Read, How to 

Use a Metered Dose Inhaler


Referrals: 


Virgen Rico MD [Ordering Only Provider] - 06/30/20 3:20 pm (APPOINTMENT AT 

[facility].)





- Discharge Summary/Plan Comment


DC Time >30 min.: No





- Patient Data


Vitals - Most Recent: 


                                Last Vital Signs











Temp  96.7 F L  06/26/20 07:49


 


Pulse  71   06/26/20 07:49


 


Resp  18   06/26/20 07:49


 


BP  107/61   06/26/20 07:49


 


Pulse Ox  92 L  06/26/20 12:27











Weight - Most Recent: 135 lb 6.4 oz


I&O - Last 24 hours: 


                                 Intake & Output











 06/25/20 06/26/20 06/26/20





 22:59 06:59 14:59


 


Intake Total 1440 240 960


 


Balance 1440 240 960











Med Orders - Current: 


                               Current Medications





Acetaminophen (Tylenol)  650 mg PO Q4H PRN


   PRN Reason: Pain (Mild 1-3)/fever


   Last Admin: 06/25/20 20:41 Dose:  650 mg


   Documented by: 


Albuterol (Proventil Neb Soln)  2.5 mg NEB Q2H PRN


   PRN Reason: Shortness Of Breath/wheezing


   Last Admin: 06/24/20 05:28 Dose:  2.5 mg


   Documented by: 


Albuterol (Ventolin Hfa)  0 gm INH Q4H PRN


   PRN Reason: Dyspnea


Albuterol/Ipratropium (Duoneb 3.0-0.5 Mg/3 Ml)  3 ml NEB QIDRT [facility]


   Last Admin: 06/26/20 10:57 Dose:  3 ml


   Documented by: 


Enoxaparin Sodium (Lovenox)  40 mg SUBCUT Q24H [facility]


   Last Admin: 06/25/20 11:24 Dose:  40 mg


   Documented by: 


Methylprednisolone Sodium Succinate (Solu-Medrol)  40 mg IVPUSH Q12H [facility]


   Last Admin: 06/26/20 09:42 Dose:  40 mg


   Documented by: 


Nicotine (Habitrol)  21 mg TRDERM DAILY [facility]


   Last Admin: 06/26/20 08:00 Dose:  21 mg


   Documented by: 


Nicotine Polacrilex (Nicorelief)  2 mg CHEW Q1H PRN


   PRN Reason: Other


Ondansetron HCl (Zofran)  4 mg IV Q4H PRN


   PRN Reason: Nausea/Vomiting


Polyethylene Glycol (Miralax)  17 gm PO DAILY PRN


   PRN Reason: Constipation


   Last Admin: 06/26/20 08:00 Dose:  17 gm


   Documented by: 


Sodium Chloride (Saline Flush)  10 ml FLUSH ASDIRECTED PRN


   PRN Reason: Keep Vein Open





Discontinued Medications





Albuterol (Proventil Neb Soln)  2.5 mg NEB ONETIME ONE


   Stop: 06/22/20 06:07


   Last Admin: 06/22/20 06:13 Dose:  2.5 mg


   Documented by: 


Albuterol (Proventil Neb Soln)  2.5 mg NEB ONETIME ONE


   Stop: 06/22/20 06:41


   Last Admin: 06/22/20 06:44 Dose:  2.5 mg


   Documented by: 


Sodium Chloride (Normal Saline)  1,000 mls @ 125 mls/hr IV ASDIRECTED [facility]


   Last Admin: 06/23/20 02:20 Dose:  125 mls/hr


   Documented by: 


Methylprednisolone Sodium Succinate (Solu-Medrol)  125 mg IVPUSH ONETIME ONE


   Stop: 06/22/20 06:13


   Last Admin: 06/22/20 06:16 Dose:  125 mg


   Documented by: 


Methylprednisolone Sodium Succinate (Solu-Medrol)  40 mg IVPUSH Q6H [facility]


   Last Admin: 06/25/20 11:24 Dose:  40 mg


   Documented by: 


Sodium Chloride (Saline Flush)  10 ml FLUSH ASDIRECTED PRN


   PRN Reason: Keep Vein Open


   Last Admin: 06/22/20 06:16 Dose:  10 ml


   Documented by: 











- Exam


General: Reports: Alert, Oriented, Cooperative, Mild Distress


Lungs: Reports: Clear to Auscultation, Normal Respiratory Effort, Decreased 

Breath Sounds


Cardiovascular: Reports: Regular Rate, Regular Rhythm, No Murmurs


GI/Abdominal Exam: Soft, Non-Tender, No Organomegaly, No Distention

## 2020-07-05 ENCOUNTER — HOSPITAL ENCOUNTER (INPATIENT)
Dept: HOSPITAL 11 - JP.ED | Age: 55
LOS: 2 days | Discharge: HOME | DRG: 203 | End: 2020-07-07
Attending: INTERNAL MEDICINE | Admitting: FAMILY MEDICINE
Payer: MEDICAID

## 2020-07-05 DIAGNOSIS — J45.52: Primary | ICD-10-CM

## 2020-07-05 DIAGNOSIS — J43.9: ICD-10-CM

## 2020-07-05 DIAGNOSIS — Z87.891: ICD-10-CM

## 2020-07-05 DIAGNOSIS — Z20.828: ICD-10-CM

## 2020-07-05 PROCEDURE — U0002 COVID-19 LAB TEST NON-CDC: HCPCS

## 2020-07-05 NOTE — EDM.PDOC
ED HPI GENERAL MEDICAL PROBLEM





- General


Chief Complaint: Asthma


Stated Complaint: SOB


Time Seen by Provider: 07/05/20 23:25


Source of Information: Reports: Patient


History Limitations: Reports: Respiratory Distress





- History of Present Illness


INITIAL COMMENTS - FREE TEXT/NARRATIVE: 





55-year-old male with known emphysema who was recently hospitalized for 5 days 

with a significant exacerbation of bronchospasm and asthma presents with a 

second increase in symptoms which started 24 hours ago.  He struggled all night 

last night, his metered-dose inhaler was helping temporarily initially but today

it has not been working all day.  He is so short of breath he cannot hardly 

walk.  No fevers or chills, no chest pain, no productive cough.  He is very 

similar to what he was prior to being admitted 2 weeks ago.


Onset: Gradual


Duration: Hour(s): (24 hours)


Associated Symptoms: Reports: Cough, Malaise, Shortness of Breath, Weakness





- Related Data


                                    Allergies











Allergy/AdvReac Type Severity Reaction Status Date / Time


 


No Known Allergies Allergy   Verified 07/06/20 00:14











Home Meds: 


                                    Home Meds





Albuterol [Ventolin HFA] 1 - 2 puff .XX Q4H PRN 06/22/20 [History]











Past Medical History


Respiratory History: Reports: Asthma





- Infectious Disease History


Infectious Disease History: Reports: Chicken Pox





- Past Surgical History


GI Surgical History: Reports: Hernia, Inguinal





Social & Family History





- Caffeine Use


Caffeine Use: Reports: Coffee





ED ROS GENERAL





- Review of Systems


Review Of Systems: See Below


Constitutional: Reports: Chills, Malaise, Weakness.  Denies: Fever


HEENT: Denies: Throat Pain


Respiratory: Reports: Shortness of Breath, Cough.  Denies: Sputum


Cardiovascular: Denies: Chest Pain


GI/Abdominal: Denies: Nausea, Vomiting


Neurological: Reports: Weakness.  Denies: Headache


Psychiatric: Reports: Anxiety





ED EXAM, GENERAL





- Physical Exam


Exam: See Below


Exam Limited By: Respiratory Distress


General Appearance: Alert, Moderate Distress


Head: Atraumatic


Neck: Supple, Non-Tender


Respiratory/Chest: Other (Marked inspiratory and expiratory wheezes are heard 

throughout both lungs)


Cardiovascular: Regular Rate, Rhythm


Extremities: No Pedal Edema


Neurological: Alert, Oriented


Psychiatric: Anxious


Skin Exam: Warm, Dry, Other (A few healing abrasions on the right thigh)





Course





- Vital Signs


Last Recorded V/S: 


                                Last Vital Signs











Temp  96.5 F L  07/06/20 04:00


 


Pulse  73   07/06/20 06:00


 


Resp  16   07/06/20 06:00


 


BP  130/82   07/06/20 06:00


 


Pulse Ox  100   07/06/20 06:00














- Orders/Labs/Meds


Orders: 


                               Active Orders 24 hr











 Category Date Time Status


 


 RT Aerosol Therapy [RC] ASDIRECTED Care  07/05/20 23:25 Active


 


 RT Aerosol Therapy [RC] ASDIRECTED Care  07/05/20 23:38 Active


 


 RT Aerosol Therapy [RC] ASDIRECTED Care  07/06/20 00:04 Active


 


 Chest 1V Frontal [CR] Stat Exams  07/05/20 23:53 Taken


 


 Sodium Chloride 0.9% Med  07/05/20 23:38 Active





 3 ml INH ASDIRECTED PRN   








                                Medication Orders





Acetaminophen (Tylenol)  650 mg PO Q4H PRN


   PRN Reason: Pain (Mild 1-3)/fever


Hydrocodone Bitart/Acetaminophen (Norco 325-5 Mg)  1 tab PO Q4H PRN


   PRN Reason: Pain (moderate 4-6)


Albuterol (Proventil Neb Soln)  2.5 mg NEB Q2H PRN


   PRN Reason: Shortness Of Breath/wheezing


Albuterol/Ipratropium (Duoneb 3.0-0.5 Mg/3 Ml)  3 ml NEB Q4H [facility]


   Last Admin: 07/06/20 03:56  Dose: 3 ml


   Documented by: SARAH


Budesonide (Pulmicort)  0.5 mg NEB BIDRT [facility]


Enoxaparin Sodium (Lovenox)  30 mg SUBCUT DAILY [facility]


Dextrose/Sodium Chloride (Dextrose 5%-1/2 Ns)  1,000 mls @ 125 mls/hr IV 

ASDIRECTED [facility]


   Last Admin: 07/06/20 00:38  Dose: 125 mls/hr


   Documented by: NGUYỄN


Promethazine HCl 12.5 mg/ (Sodium Chloride)  50.5 mls @ 200 mls/hr IV Q6H PRN


   PRN Reason: Nausea/Vomiting


Azithromycin 500 mg/ Sodium (Chloride)  250 mls @ 250 mls/hr IV Q24H [facility]


   Last Admin: 07/06/20 01:40  Dose: 250 mls/hr


   Documented by: SARAH


Lorazepam (Ativan)  1 mg IVPUSH Q4H PRN


   PRN Reason: Anxiety


Methylprednisolone Sodium Succinate (Solu-Medrol)  60 mg IVPUSH Q6H [facility]


   Last Admin: 07/06/20 05:54  Dose: 60 mg


   Documented by: SARAH


Morphine Sulfate (Morphine)  2 mg IVPUSH Q2H PRN


   PRN Reason: Pain (severe 7-10)


Ondansetron HCl (Zofran Odt)  4 mg PO Q8H PRN


   PRN Reason: Nausea able to take PO


Ondansetron HCl (Zofran)  4 mg IV Q4H PRN


   PRN Reason: Nausea/Vomiting


Sodium Chloride (Sodium Chloride 0.9%)  3 ml INH ASDIRECTED PRN


   PRN Reason: mix with racepinephrine neb


   Last Admin: 07/05/20 23:47  Dose: 3 ml


   Documented by: NGUYỄN








Labs: 


                                Laboratory Tests











  07/05/20 07/05/20 07/05/20 Range/Units





  23:45 23:45 23:45 


 


WBC  14.4 H    (4.5-11.0)  K/uL


 


RBC  4.36    (4.30-5.90)  M/uL


 


Hgb  14.1    (12.0-15.0)  g/dL


 


Hct  41.0    (40.0-54.0)  %


 


MCV  94    (80-98)  fL


 


MCH  32 H    (27-31)  pg


 


MCHC  34    (32-36)  %


 


Plt Count  260    (150-400)  K/uL


 


Neut % (Auto)  76 H    (36-66)  %


 


Lymph % (Auto)  13 L    (24-44)  %


 


Mono % (Auto)  8 H    (2-6)  %


 


Eos % (Auto)  3    (2-4)  %


 


Baso % (Auto)  0    (0-1)  %


 


Puncture Site   R brachial   


 


ABG pH   7.353   (7.350-7.450)  


 


ABG pCO2   45.6 H   (35.0-42.0)  mmHg


 


ABG pO2   79.1   (75.0-100.0)  mmHg


 


ABG HCO3   24.7   (22.0-26.0)  mmol/L


 


ABG Total CO2   22.0 L   (23.0-27.0)  mmol/L


 


ABG O2 Saturation   93.7 L   (95.0-98.0)  %


 


ABG O2 Content   18.3   (15.0-23.0)  %vol


 


ABG Base Excess   -0.6   mm/L


 


ABG Hemoglobin   14.1   (13.5-18.0)  g/dL


 


ABG Oxyhemoglobin   92.3   %


 


ABG Carboxyhemoglobin   0.7   (0.0-1.6)  %


 


ABG Methemoglobin   0.8   %


 


O2 Delivery Device   Room air   


 


Sodium    141  (140-148)  mmol/L


 


Potassium    4.0  (3.6-5.2)  mmol/L


 


Chloride    107  (100-108)  mmol/L


 


Carbon Dioxide    24  (21-32)  mmol/L


 


Anion Gap    9.8  (5.0-14.0)  mmol/L


 


BUN    13  (7-18)  mg/dL


 


Creatinine    1.0  (0.8-1.3)  mg/dL


 


Est Cr Clr Drug Dosing    81.12  mL/min


 


Estimated GFR (MDRD)    > 60  (>60)  


 


Glucose    116 H  ()  mg/dL


 


Calcium    8.7  (8.5-10.1)  mg/dL











Meds: 


Medications











Generic Name Dose Route Start Last Admin





  Trade Name Freq  PRN Reason Stop Dose Admin


 


Acetaminophen  650 mg  07/06/20 00:17 





  Tylenol  PO  





  Q4H PRN  





  Pain (Mild 1-3)/fever  


 


Hydrocodone Bitart/Acetaminophen  1 tab  07/06/20 00:17 





  Norco 325-5 Mg  PO  





  Q4H PRN  





  Pain (moderate 4-6)  


 


Albuterol  2.5 mg  07/06/20 00:17 





  Proventil Neb Soln  NEB  





  Q2H PRN  





  Shortness Of Breath/wheezing  


 


Albuterol/Ipratropium  3 ml  07/06/20 03:30  07/06/20 03:56





  Duoneb 3.0-0.5 Mg/3 Ml  NEB   3 ml





  Q4H [facility]   Administration


 


Budesonide  0.5 mg  07/06/20 07:00 





  Pulmicort  NEB  





  BIDRT [facility]  


 


Enoxaparin Sodium  30 mg  07/06/20 09:00 





  Lovenox  SUBCUT  





  DAILY [facility]  


 


Dextrose/Sodium Chloride  1,000 mls @ 125 mls/hr  07/06/20 00:30  07/06/20 00:38





  Dextrose 5%-1/2 Ns  IV   125 mls/hr





  ASDIRECTED [facility]   Administration


 


Promethazine HCl 12.5 mg/  50.5 mls @ 200 mls/hr  07/06/20 00:17 





  Sodium Chloride  IV  





  Q6H PRN  





  Nausea/Vomiting  


 


Azithromycin 500 mg/ Sodium  250 mls @ 250 mls/hr  07/06/20 01:00  07/06/20 

01:40





  Chloride  IV   250 mls/hr





  Q24H [facility]   Administration


 


Lorazepam  1 mg  07/06/20 01:03 





  Ativan  IVPUSH  





  Q4H PRN  





  Anxiety  


 


Methylprednisolone Sodium Succinate  60 mg  07/06/20 06:00  07/06/20 05:54





  Solu-Medrol  IVPUSH   60 mg





  Q6H [facility]   Administration


 


Morphine Sulfate  2 mg  07/06/20 00:17 





  Morphine  IVPUSH  





  Q2H PRN  





  Pain (severe 7-10)  


 


Ondansetron HCl  4 mg  07/06/20 00:17 





  Zofran Odt  PO  





  Q8H PRN  





  Nausea able to take PO  


 


Ondansetron HCl  4 mg  07/06/20 00:17 





  Zofran  IV  





  Q4H PRN  





  Nausea/Vomiting  


 


Sodium Chloride  3 ml  07/05/20 23:38  07/05/20 23:47





  Sodium Chloride 0.9%  INH   3 ml





  ASDIRECTED PRN   Administration





  mix with racepinephrine neb  














Discontinued Medications














Generic Name Dose Route Start Last Admin





  Trade Name Freq  PRN Reason Stop Dose Admin


 


Albuterol  2.5 mg  07/06/20 00:04  07/06/20 00:10





  Proventil Neb Soln  NEB  07/06/20 00:05  2.5 mg





  ONETIME ONE   Administration


 


Albuterol/Ipratropium  3 ml  07/05/20 23:25  07/05/20 23:28





  Duoneb 3.0-0.5 Mg/3 Ml  NEB  07/05/20 23:26  3 ml





  ONETIME ONE   Administration


 


Epinephrine HCl  0.3 mg  07/05/20 23:38  07/05/20 23:47





  Adrenalin  SUBCUT  07/05/20 23:39  0.3 mg





  ONETIME ONE   Administration


 


Magnesium Sulfate 2 gm/ Premix  50 mls @ 12.5 mls/hr  07/06/20 00:17  07/06/20 

00:29





  IV  07/06/20 04:16  12.5 mls/hr





  ONETIME ONE   Administration


 


Lorazepam  1 mg  07/06/20 00:31 





  Ativan  IVPUSH  





  Q8H PRN  





  Agitation  


 


Methylprednisolone Sodium Succinate  125 mg  07/05/20 23:25  07/05/20 23:36





  Solu-Medrol  IVPUSH  07/05/20 23:26  125 mg





  ONETIME ONE   Administration


 


Racepinephrine  0.5 ml  07/05/20 23:38  07/05/20 23:47





  S-2 2.25%  NEB  07/05/20 23:39  0.5 ml





  ONETIME ONE   Administration














- Re-Assessments/Exams


Free Text/Narrative Re-Assessment/Exam: 





07/05/20 23:45


Patient was given an urgent DuoNeb, IV was started and he was given 125 mg of 

Solu-Medrol.  He did not have significant initial improvement after the DuoNeb 

so he was given a racemic epinephrine nebulizer along with 0.3 mg of subcu epine

phrine.  ABGs, CBC and BMP were obtained.  This patient will have to be 

admitted, Dr. Harriet Paez was contacted.


07/06/20 00:06


ABGs returned relatively normal, patient had some brief improvement subjectively

 after the DuoNeb and racemic epinephrine but then started to tire again.  He 

was given another albuterol nebulizer.  Dr. Paez arrived for admission.  Chest 

x-ray shows significant hyperinflation and COPD changes but no infiltrate.





Departure





- Departure


Time of Disposition: 01:38


Disposition: Admitted As Inpatient 68


Clinical Impression: 


Asthma with status asthmaticus


Qualifiers:


 Asthma severity: severe Asthma persistence: persistent Qualified Code(s): 

J45.52 - Severe persistent asthma with status asthmaticus








- Discharge Information





Sepsis Event Note (ED)





- Focused Exam


Vital Signs: 


                                   Vital Signs











  Temp Pulse Resp BP Pulse Ox


 


 07/05/20 23:51  96.0 F L  83  19  172/111 H  94 L


 


 07/05/20 23:37  96.0 F L  83  18  172/111 H  94 L














- My Orders


Last 24 Hours: 


My Active Orders





07/05/20 23:25


RT Aerosol Therapy [RC] ASDIRECTED 





07/05/20 23:38


RT Aerosol Therapy [RC] ASDIRECTED 


Sodium Chloride 0.9%   3 ml INH ASDIRECTED PRN 





07/05/20 23:53


Chest 1V Frontal [CR] Stat 





07/06/20 00:04


RT Aerosol Therapy [RC] ASDIRECTED 














- Assessment/Plan


Last 24 Hours: 


My Active Orders





07/05/20 23:25


RT Aerosol Therapy [RC] ASDIRECTED 





07/05/20 23:38


RT Aerosol Therapy [RC] ASDIRECTED 


Sodium Chloride 0.9%   3 ml INH ASDIRECTED PRN 





07/05/20 23:53


Chest 1V Frontal [CR] Stat 





07/06/20 00:04


RT Aerosol Therapy [RC] ASDIRECTED

## 2020-07-06 PROCEDURE — 5A09357 ASSISTANCE WITH RESPIRATORY VENTILATION, LESS THAN 24 CONSECUTIVE HOURS, CONTINUOUS POSITIVE AIRWAY PRESSURE: ICD-10-PCS | Performed by: INTERNAL MEDICINE

## 2020-07-06 RX ADMIN — BUDESONIDE SCH MG: 0.5 SUSPENSION RESPIRATORY (INHALATION) at 07:02

## 2020-07-06 RX ADMIN — BUDESONIDE SCH MG: 0.5 SUSPENSION RESPIRATORY (INHALATION) at 21:20

## 2020-07-06 NOTE — PCM.PN
- General Info


Date of Service: 07/06/20


Subjective Update: 





No acute events overnight following admission.  He did use noninvasive 

ventilation overnight but is now off all supplemental oxygen.  His shortness of 

breath is much better than last night.  He does have some residual wheezing.  

Heart rate and blood pressure have been stable.  No fevers.  No significant 

cough at this time.


Functional Status: Reports: Pain Controlled





- Review of Systems


General: Denies: Fever


Pulmonary: Reports: Shortness of Breath





- Patient Data


Vitals - Most Recent: 


                                Last Vital Signs











Temp  35.9 C L  07/06/20 08:00


 


Pulse  86   07/06/20 07:02


 


Resp  16   07/06/20 08:00


 


BP  136/81   07/06/20 08:00


 


Pulse Ox  97   07/06/20 08:00











Weight - Most Recent: 63.548 kg


I&O - Last 24 Hours: 


                                 Intake & Output











 07/05/20 07/06/20 07/06/20





 22:59 06:59 14:59


 


Intake Total  846 200


 


Output Total  500 600


 


Balance  346 -400











Lab Results Last 24 Hours: 


                         Laboratory Results - last 24 hr











  07/05/20 07/05/20 07/05/20 Range/Units





  23:45 23:45 23:45 


 


WBC  14.4 H    (4.5-11.0)  K/uL


 


RBC  4.36    (4.30-5.90)  M/uL


 


Hgb  14.1    (12.0-15.0)  g/dL


 


Hct  41.0    (40.0-54.0)  %


 


MCV  94    (80-98)  fL


 


MCH  32 H    (27-31)  pg


 


MCHC  34    (32-36)  %


 


Plt Count  260    (150-400)  K/uL


 


Neut % (Auto)  76 H    (36-66)  %


 


Lymph % (Auto)  13 L    (24-44)  %


 


Mono % (Auto)  8 H    (2-6)  %


 


Eos % (Auto)  3    (2-4)  %


 


Baso % (Auto)  0    (0-1)  %


 


Puncture Site   R brachial   


 


ABG pH   7.353   (7.350-7.450)  


 


ABG pCO2   45.6 H   (35.0-42.0)  mmHg


 


ABG pO2   79.1   (75.0-100.0)  mmHg


 


ABG HCO3   24.7   (22.0-26.0)  mmol/L


 


ABG Total CO2   22.0 L   (23.0-27.0)  mmol/L


 


ABG O2 Saturation   93.7 L   (95.0-98.0)  %


 


ABG O2 Content   18.3   (15.0-23.0)  %vol


 


ABG Base Excess   -0.6   mm/L


 


ABG Hemoglobin   14.1   (13.5-18.0)  g/dL


 


ABG Oxyhemoglobin   92.3   %


 


ABG Carboxyhemoglobin   0.7   (0.0-1.6)  %


 


ABG Methemoglobin   0.8   %


 


O2 Delivery Device   Room air   


 


Oxygen Flow Rate     L


 


Sodium    141  (140-148)  mmol/L


 


Potassium    4.0  (3.6-5.2)  mmol/L


 


Chloride    107  (100-108)  mmol/L


 


Carbon Dioxide    24  (21-32)  mmol/L


 


Anion Gap    9.8  (5.0-14.0)  mmol/L


 


BUN    13  (7-18)  mg/dL


 


Creatinine    1.0  (0.8-1.3)  mg/dL


 


Est Cr Clr Drug Dosing    81.12  mL/min


 


Estimated GFR (MDRD)    > 60  (>60)  


 


Glucose    116 H  ()  mg/dL


 


Calcium    8.7  (8.5-10.1)  mg/dL


 


Magnesium     (1.8-2.4)  mg/dL


 


Total Bilirubin     (0.2-1.0)  mg/dL


 


AST     (15-37)  U/L


 


ALT     (12-78)  U/L


 


Alkaline Phosphatase     ()  U/L


 


C-Reactive Protein     (0.0-0.3)  mg/dL


 


Total Protein     (6.4-8.2)  g/dL


 


Albumin     (3.4-5.0)  g/dL


 


Globulin     (2.3-3.5)  g/dL


 


Albumin/Globulin Ratio     (1.2-2.2)  


 


Procalcitonin     ng/mL


 


SARS Virus RNA (PCR)     (NEGATIVE)  














  07/06/20 07/06/20 07/06/20 Range/Units





  01:15 06:48 06:48 


 


WBC   18.5 H   (4.5-11.0)  K/uL


 


RBC   4.10 L   (4.30-5.90)  M/uL


 


Hgb   12.9   (12.0-15.0)  g/dL


 


Hct   38.5 L   (40.0-54.0)  %


 


MCV   94   (80-98)  fL


 


MCH   32 H   (27-31)  pg


 


MCHC   34   (32-36)  %


 


Plt Count   332   (150-400)  K/uL


 


Neut % (Auto)   98 H   (36-66)  %


 


Lymph % (Auto)   2 L   (24-44)  %


 


Mono % (Auto)   0 L   (2-6)  %


 


Eos % (Auto)   0 L   (2-4)  %


 


Baso % (Auto)   0   (0-1)  %


 


Puncture Site    R brachial  


 


ABG pH    7.373  (7.350-7.450)  


 


ABG pCO2    38.4  (35.0-42.0)  mmHg


 


ABG pO2    125.0 H  (75.0-100.0)  mmHg


 


ABG HCO3    21.9 L  (22.0-26.0)  mmol/L


 


ABG Total CO2    19.6 L  (23.0-27.0)  mmol/L


 


ABG O2 Saturation    98.4 H  (95.0-98.0)  %


 


ABG O2 Content    18.1  (15.0-23.0)  %vol


 


ABG Base Excess    -2.5  mm/L


 


ABG Hemoglobin    13.2 L  (13.5-18.0)  g/dL


 


ABG Oxyhemoglobin    96.9  %


 


ABG Carboxyhemoglobin    0.6  (0.0-1.6)  %


 


ABG Methemoglobin    0.9  %


 


O2 Delivery Device    Bipap  


 


Oxygen Flow Rate      L


 


Sodium     (140-148)  mmol/L


 


Potassium     (3.6-5.2)  mmol/L


 


Chloride     (100-108)  mmol/L


 


Carbon Dioxide     (21-32)  mmol/L


 


Anion Gap     (5.0-14.0)  mmol/L


 


BUN     (7-18)  mg/dL


 


Creatinine     (0.8-1.3)  mg/dL


 


Est Cr Clr Drug Dosing     mL/min


 


Estimated GFR (MDRD)     (>60)  


 


Glucose     ()  mg/dL


 


Calcium     (8.5-10.1)  mg/dL


 


Magnesium     (1.8-2.4)  mg/dL


 


Total Bilirubin     (0.2-1.0)  mg/dL


 


AST     (15-37)  U/L


 


ALT     (12-78)  U/L


 


Alkaline Phosphatase     ()  U/L


 


C-Reactive Protein     (0.0-0.3)  mg/dL


 


Total Protein     (6.4-8.2)  g/dL


 


Albumin     (3.4-5.0)  g/dL


 


Globulin     (2.3-3.5)  g/dL


 


Albumin/Globulin Ratio     (1.2-2.2)  


 


Procalcitonin     ng/mL


 


SARS Virus RNA (PCR)  Negative    (NEGATIVE)  














  07/06/20 07/06/20 07/06/20 Range/Units





  06:48 07:00 07:00 


 


WBC     (4.5-11.0)  K/uL


 


RBC     (4.30-5.90)  M/uL


 


Hgb     (12.0-15.0)  g/dL


 


Hct     (40.0-54.0)  %


 


MCV     (80-98)  fL


 


MCH     (27-31)  pg


 


MCHC     (32-36)  %


 


Plt Count     (150-400)  K/uL


 


Neut % (Auto)     (36-66)  %


 


Lymph % (Auto)     (24-44)  %


 


Mono % (Auto)     (2-6)  %


 


Eos % (Auto)     (2-4)  %


 


Baso % (Auto)     (0-1)  %


 


Puncture Site     


 


ABG pH     (7.350-7.450)  


 


ABG pCO2     (35.0-42.0)  mmHg


 


ABG pO2     (75.0-100.0)  mmHg


 


ABG HCO3     (22.0-26.0)  mmol/L


 


ABG Total CO2     (23.0-27.0)  mmol/L


 


ABG O2 Saturation     (95.0-98.0)  %


 


ABG O2 Content     (15.0-23.0)  %vol


 


ABG Base Excess     mm/L


 


ABG Hemoglobin     (13.5-18.0)  g/dL


 


ABG Oxyhemoglobin     %


 


ABG Carboxyhemoglobin     (0.0-1.6)  %


 


ABG Methemoglobin     %


 


O2 Delivery Device     


 


Oxygen Flow Rate     L


 


Sodium  138 L    (140-148)  mmol/L


 


Potassium  3.8    (3.6-5.2)  mmol/L


 


Chloride  105    (100-108)  mmol/L


 


Carbon Dioxide  21    (21-32)  mmol/L


 


Anion Gap  15.8 H    (5.0-14.0)  mmol/L


 


BUN  11    (7-18)  mg/dL


 


Creatinine  1.0    (0.8-1.3)  mg/dL


 


Est Cr Clr Drug Dosing  76.79    mL/min


 


Estimated GFR (MDRD)  > 60    (>60)  


 


Glucose  204 H    ()  mg/dL


 


Calcium  8.7    (8.5-10.1)  mg/dL


 


Magnesium  2.0    (1.8-2.4)  mg/dL


 


Total Bilirubin  0.4    (0.2-1.0)  mg/dL


 


AST  20    (15-37)  U/L


 


ALT  39    (12-78)  U/L


 


Alkaline Phosphatase  55    ()  U/L


 


C-Reactive Protein   < 0.05   (0.0-0.3)  mg/dL


 


Total Protein  7.1    (6.4-8.2)  g/dL


 


Albumin  3.1 L    (3.4-5.0)  g/dL


 


Globulin  4.0 H    (2.3-3.5)  g/dL


 


Albumin/Globulin Ratio  0.8 L    (1.2-2.2)  


 


Procalcitonin    < 0.05  ng/mL


 


SARS Virus RNA (PCR)     (NEGATIVE)  











Med Orders - Current: 


                               Current Medications





Acetaminophen (Tylenol)  650 mg PO Q4H PRN


   PRN Reason: Pain (Mild 1-3)/fever


Hydrocodone Bitart/Acetaminophen (Norco 325-5 Mg)  1 tab PO Q4H PRN


   PRN Reason: Pain (moderate 4-6)


Albuterol (Proventil Neb Soln)  2.5 mg NEB Q2H PRN


   PRN Reason: Shortness Of Breath/wheezing


Albuterol/Ipratropium (Duoneb 3.0-0.5 Mg/3 Ml)  3 ml NEB QIDRT [facility]


Budesonide (Pulmicort)  0.5 mg NEB BIDRT [facility]


   Last Admin: 07/06/20 07:02 Dose:  0.5 mg


   Documented by: 


Enoxaparin Sodium (Lovenox)  40 mg SUBCUT DAILY [facility]


   Last Admin: 07/06/20 08:37 Dose:  40 mg


   Documented by: 


Dextrose/Sodium Chloride (Dextrose 5%-1/2 Ns)  1,000 mls @ 125 mls/hr IV 

ASDIRECTED [facility]


   Last Admin: 07/06/20 00:38 Dose:  125 mls/hr


   Documented by: 


Promethazine HCl 12.5 mg/ (Sodium Chloride)  50.5 mls @ 200 mls/hr IV Q6H PRN


   PRN Reason: Nausea/Vomiting


Lorazepam (Ativan)  1 mg IVPUSH Q4H PRN


   PRN Reason: Anxiety


Morphine Sulfate (Morphine)  2 mg IVPUSH Q2H PRN


   PRN Reason: Pain (severe 7-10)


Ondansetron HCl (Zofran Odt)  4 mg PO Q8H PRN


   PRN Reason: Nausea able to take PO


Ondansetron HCl (Zofran)  4 mg IV Q4H PRN


   PRN Reason: Nausea/Vomiting


Prednisone (Prednisone)  20 mg PO BIDAC [facility]





Discontinued Medications





Albuterol (Proventil Neb Soln)  2.5 mg NEB ONETIME ONE


   Stop: 07/06/20 00:05


   Last Admin: 07/06/20 00:10 Dose:  2.5 mg


   Documented by: 


Albuterol/Ipratropium (Duoneb 3.0-0.5 Mg/3 Ml)  3 ml NEB ONETIME ONE


   Stop: 07/05/20 23:26


   Last Admin: 07/05/20 23:28 Dose:  3 ml


   Documented by: 


Albuterol/Ipratropium (Duoneb 3.0-0.5 Mg/3 Ml)  3 ml NEB Q4H [facility]


   Last Admin: 07/06/20 03:56 Dose:  3 ml


   Documented by: 


Albuterol/Ipratropium (Duoneb 3.0-0.5 Mg/3 Ml)  3 ml NEB Q4H [facility]


   Last Admin: 07/06/20 07:02 Dose:  3 ml


   Documented by: 


Azithromycin (Zithromax)  500 mg PO BEDTIME [facility]


Epinephrine HCl (Adrenalin)  0.3 mg SUBCUT ONETIME ONE


   Stop: 07/05/20 23:39


   Last Admin: 07/05/20 23:47 Dose:  0.3 mg


   Documented by: 


Magnesium Sulfate 2 gm/ Premix  50 mls @ 12.5 mls/hr IV ONETIME ONE


   Stop: 07/06/20 04:16


   Last Admin: 07/06/20 00:29 Dose:  12.5 mls/hr


   Documented by: 


Azithromycin 500 mg/ Sodium (Chloride)  250 mls @ 250 mls/hr IV Q24H [facility]


   Last Admin: 07/06/20 01:40 Dose:  250 mls/hr


   Documented by: 


Azithromycin 500 mg/ Sodium (Chloride)  250 mls @ 250 mls/hr IV Q24H [facility]


Ceftriaxone Sodium 2 gm/ (Sodium Chloride)  50 mls @ 100 mls/hr IV Q24H [facility]


   Last Admin: 07/06/20 08:37 Dose:  100 mls/hr


   Documented by: 


Lorazepam (Ativan)  1 mg IVPUSH Q8H PRN


   PRN Reason: Agitation


Methylprednisolone Sodium Succinate (Solu-Medrol)  125 mg IVPUSH ONETIME ONE


   Stop: 07/05/20 23:26


   Last Admin: 07/05/20 23:36 Dose:  125 mg


   Documented by: 


Methylprednisolone Sodium Succinate (Solu-Medrol)  60 mg IVPUSH Q6H [facility]


   Last Admin: 07/06/20 05:54 Dose:  60 mg


   Documented by: 


Methylprednisolone Sodium Succinate (Solu-Medrol)  62.5 mg IVPUSH Q8H [facility]


Racepinephrine (S-2 2.25%)  0.5 ml NEB ONETIME ONE


   Stop: 07/05/20 23:39


   Last Admin: 07/05/20 23:47 Dose:  0.5 ml


   Documented by: 


Sodium Chloride (Sodium Chloride 0.9%)  3 ml INH ASDIRECTED PRN


   PRN Reason: mix with racepinephrine neb


   Last Admin: 07/05/20 23:47 Dose:  3 ml


   Documented by: 











- Exam


Quality Assessment: No: Supplemental Oxygen


General: Alert, Oriented, Cooperative, No Acute Distress


Lungs: Normal Respiratory Effort, Wheezing (mild inspiratory and expiratory )


Cardiovascular: Regular Rate, Regular Rhythm


GI/Abdominal Exam: Soft, No Distention


Extremities: No Pedal Edema


Psy/Mental Status: Alert, Normal Affect





Sepsis Event Note





- Evaluation


Sepsis Screening Result: No Definite Risk





- Focused Exam


Vital Signs: 


                                   Vital Signs











  Temp Pulse Resp BP Pulse Ox


 


 07/06/20 08:00  35.9 C L   16  136/81  97


 


 07/06/20 07:02   86   


 


 07/06/20 06:00   73  16  130/82  100


 


 07/06/20 04:00  35.8 C L   16  140/94 H  100


 


 07/06/20 02:00    20  137/92 H  99


 


 07/06/20 01:30  35.8 C L   24 H  132/82  99


 


 07/06/20 00:31   79  20   99


 


 07/05/20 23:51  35.6 C L  83  19  172/111 H  94 L


 


 07/05/20 23:37  35.6 C L  83  18  172/111 H  94 L











Date Exam was Performed: 07/06/20


Time Exam was Performed: 13:25





- Problem List Review


Problem List Initiated/Reviewed/Updated: Yes





- My Orders


Last 24 Hours: 


My Active Orders





07/06/20 08:35


Antiembolic Devices [RC] .Routine 


SCD [Sequential Compression Device] [OM.PC] Routine 





07/06/20 11:00


Albuterol/Ipratropium [DuoNeb 3.0-0.5 MG/3 ML]   3 ml NEB QIDRT 





07/06/20 16:30


predniSONE   20 mg PO BIDAC 














- Plan


Plan:: 





ASSESSMENT AND PLAN - 





Asthma with acute exacerbation-recent hospital stay for similar.  CRP and 

procalcitonin are undetectable so I suspect this is more environmental than 

infectious.  Improving with current therapies.


-Scheduled and as needed nebulizers


-Transition steroids to prednisone


-Discontinue antibiotics


-Supplement oxygen as needed


-Consider controller medication





Tobacco dependence-fairly recent history of smoking with long history of tobacco

use.  He is currently abstaining.


-Encourage ongoing abstinence





Maintenance issues - 


- DVT prophylaxis -mechanical


- GI prophylaxis -not indicated


- Nutrition -regular





Disposition -I would anticipate discharge home after the hospital stay





Eliseo Kelley M.D.

## 2020-07-06 NOTE — PCM.HP.2
H&P History of Present Illness





- General


Date of Service: 07/06/20


Admit Problem/Dx: 


                           Admission Diagnosis/Problem





Admission Diagnosis/Problem      Respiratory failure without hypercapnia








Source of Information: Patient, Old Records


History Limitations: Reports: No Limitations, Respiratory Distress





- History of Present Illness


Initial Comments - Free Text/Narative: 





Patient is a 54yo male with PMH of asthma and worsening respiratory status over 

the last several weeks. Patient was admitted to hospital ~2 weeks ago for same 

respiratory picture. He says he has no covid exposure. He has not had a fever or

chills. He says he started having severe respiratory issues 2 weeks ago, but 

patient has a 40 pack year history of smoking and is a  in his work. 

Patient does say he has quit smoking since his last admission. He did respond in

the ER to some Duo-neb nebulizer and subQ epi treatments. Patient says he did 

not FU with a primary care physician since his last admission. 


Onset of Symptoms: Reports: Gradual


Symptom Onset Date: 07/05/20


Duration of Symptoms: Reports: Week(s):, Getting Worse


Improves with: Reports: Rest


Worsens with: Reports: Movement


Associated Symptoms: Reports: Cough, Shortness of Breath.  Denies: Confusion, 

Chest Pain, Fever/Chills, Headaches





- Related Data


Allergies/Adverse Reactions: 


                                    Allergies











Allergy/AdvReac Type Severity Reaction Status Date / Time


 


No Known Allergies Allergy   Verified 07/06/20 00:14











Home Medications: 


                                    Home Meds





Albuterol [Ventolin HFA] 1 - 2 puff .XX Q4H PRN 06/22/20 [History]











Past Medical History


Respiratory History: Reports: Asthma





- Infectious Disease History


Infectious Disease History: Reports: Chicken Pox





- Past Surgical History


GI Surgical History: Reports: Hernia, Inguinal





Social & Family History





- Tobacco Use


Smoking Status *Q: Former Smoker


Used Tobacco, but Quit: Yes


Month/Year Tobacco Last Used: 6/2020





- Caffeine Use


Caffeine Use: Reports: Coffee





H&P Review of Systems





- Review of Systems:


Review Of Systems: See Below


General: Denies: Fever, Chills


HEENT: Reports: No Symptoms


Pulmonary: Reports: Shortness of Breath, Wheezing, Cough


Cardiovascular: Reports: Dyspnea on Exertion.  Denies: Chest Pain, Palpitations,

 Orthopnea, PND


Gastrointestinal: Reports: No Symptoms


Genitourinary: Reports: No Symptoms


Musculoskeletal: Reports: No Symptoms


Skin: Reports: No Symptoms


Psychiatric: Reports: No Symptoms


Neurological: Reports: No Symptoms


Hematologic/Lymphatic: Reports: No Symptoms


Immunologic: Reports: No Symptoms





Exam





- Exam


Exam: See Below





- Vital Signs


Vital Signs: 


                                Last Vital Signs











Temp  35.6 C L  07/05/20 23:51


 


Pulse  79   07/06/20 00:31


 


Resp  20   07/06/20 00:31


 


BP  172/111 H  07/05/20 23:51


 


Pulse Ox  99   07/06/20 00:31











Weight: 67.132 kg





- Exam


General: Alert, Oriented, 4


HEENT: PERRLA, Hearing Intact, Mucosa Moist & Pink, Nares Patent, Normal Nasal 

Septum, Posterior Pharynx Clear, Conjunctiva Clear, EOMI, EACs Clear, TMs Clear


Neck: Supple, Trachea Midline


Lungs: Wheezing (diffusely/all fields), Other (significant respiratory effort 

and accessory muscle use)


Cardiovascular: Regular Rate, Regular Rhythm


GI/Abdominal Exam: Normal Bowel Sounds, Soft, Non-Tender, No Organomegaly, No 

Distention, No Abnormal Bruit, No Mass, Pelvis Stable


 (Male) Exam: Deferred


Rectal (Males) Exam: Deferred


Back Exam: Normal Inspection, Full Range of Motion, NT


Extremities: Normal Inspection, Normal Range of Motion, Non-Tender, No Pedal 

Edema, Normal Capillary Refill


Skin: Warm, Dry, Wound (healing wounds on R thigh, POA)


Neurological: Cranial Nerves Intact, Reflexes Equal Bilateral


Neuro Extensive - Mental Status: Alert, Oriented x3, Normal Mood/Affect, Normal 

Cognition


Neuro Extensive - Motor, Sensory, Reflexes: CN II-XII Intact


Psychiatric: Alert, Normal Affect, Normal Mood





- Patient Data


Lab Results Last 24 hrs: 


                         Laboratory Results - last 24 hr











  07/05/20 07/05/20 07/05/20 Range/Units





  23:45 23:45 23:45 


 


WBC  14.4 H    (4.5-11.0)  K/uL


 


RBC  4.36    (4.30-5.90)  M/uL


 


Hgb  14.1    (12.0-15.0)  g/dL


 


Hct  41.0    (40.0-54.0)  %


 


MCV  94    (80-98)  fL


 


MCH  32 H    (27-31)  pg


 


MCHC  34    (32-36)  %


 


Plt Count  260    (150-400)  K/uL


 


Neut % (Auto)  76 H    (36-66)  %


 


Lymph % (Auto)  13 L    (24-44)  %


 


Mono % (Auto)  8 H    (2-6)  %


 


Eos % (Auto)  3    (2-4)  %


 


Baso % (Auto)  0    (0-1)  %


 


Puncture Site   R brachial   


 


ABG pH   7.353   (7.350-7.450)  


 


ABG pCO2   45.6 H   (35.0-42.0)  mmHg


 


ABG pO2   79.1   (75.0-100.0)  mmHg


 


ABG HCO3   24.7   (22.0-26.0)  mmol/L


 


ABG Total CO2   22.0 L   (23.0-27.0)  mmol/L


 


ABG O2 Saturation   93.7 L   (95.0-98.0)  %


 


ABG O2 Content   18.3   (15.0-23.0)  %vol


 


ABG Base Excess   -0.6   mm/L


 


ABG Hemoglobin   14.1   (13.5-18.0)  g/dL


 


ABG Oxyhemoglobin   92.3   %


 


ABG Carboxyhemoglobin   0.7   (0.0-1.6)  %


 


ABG Methemoglobin   0.8   %


 


O2 Delivery Device   Room air   


 


Sodium    141  (140-148)  mmol/L


 


Potassium    4.0  (3.6-5.2)  mmol/L


 


Chloride    107  (100-108)  mmol/L


 


Carbon Dioxide    24  (21-32)  mmol/L


 


Anion Gap    9.8  (5.0-14.0)  mmol/L


 


BUN    13  (7-18)  mg/dL


 


Creatinine    1.0  (0.8-1.3)  mg/dL


 


Est Cr Clr Drug Dosing    81.12  mL/min


 


Estimated GFR (MDRD)    > 60  (>60)  


 


Glucose    116 H  ()  mg/dL


 


Calcium    8.7  (8.5-10.1)  mg/dL











Result Diagrams: 


                                 07/05/20 23:45





                                 07/05/20 23:45





Sepsis Event Note





- Evaluation


Sepsis Screening Result: No Definite Risk





- Focused Exam


Vital Signs: 


                                   Vital Signs











  Temp Pulse Resp BP Pulse Ox


 


 07/06/20 00:31   79  20   99


 


 07/05/20 23:51  35.6 C L  83  19  172/111 H  94 L


 


 07/05/20 23:37  35.6 C L  83  18  172/111 H  94 L











Date Exam was Performed: 07/06/20


Time Exam was Performed: 00:41





- Problem List


(1) COPD exacerbation


SNOMED Code(s): 899672318


   ICD Code: J44.1 - CHRONIC OBSTRUCTIVE PULMONARY DISEASE W (ACUTE) 

EXACERBATION   Status: Acute   Priority: High   Current Visit: Yes   Onset Date:

 Unknown   Problem Details: Will give patient duonebs, albuterol, inhaled 

budesonide, azithromycin, IV solumedrol, and possibly bipap and intubation. 

Patient will be placed in the ICU due to tenuous respiratory status. Patient has

 considerable accessory muscle use and wheezing and is at high risk for 

respiratory compromise. Patient will remain NPO at this time until it is clear 

whether or not he will require intubation or BiPAP. Ativan ordered as PRN for 

agitation if Bipap is needed.    





(2) Emphysema lung


SNOMED Code(s): 62117252


   ICD Code: J43.9 - EMPHYSEMA, UNSPECIFIED   Status: Acute   Priority: High   

Current Visit: Yes   Problem Details: Will give patient duonebs, albuterol, inh

aled budesonide, azithromycin, IV solumedrol, and possibly bipap and intubation.

 Patient will be placed in the ICU due to tenuous respiratory status. Patient 

has considerable accessory muscle use and wheezing and is at high risk for 

respiratory compromise. Patient will remain NPO at this time until it is clear 

whether or not he will require intubation or BiPAP. Ativan ordered as PRN for 

agitation if Bipap is needed.    


Qualifiers: 


   Emphysema type: unspecified   Qualified Code(s): J43.9 - Emphysema, 

unspecified   





(3) Asthma


SNOMED Code(s): 618738405


   ICD Code: J45.909 - UNSPECIFIED ASTHMA, UNCOMPLICATED   Status: Chronic   

Priority: High   Current Visit: Yes   Problem Details: Will give patient 

duonebs, albuterol, inhaled budesonide, azithromycin, IV solumedrol, and pos

sibly bipap and intubation. Patient will be placed in the ICU due to tenuous 

respiratory status. Patient has considerable accessory muscle use and wheezing 

and is at high risk for respiratory compromise. Patient will remain NPO at this 

time until it is clear whether or not he will require intubation or BiPAP. 

Ativan ordered as PRN for agitation if Bipap is needed.    


Qualifiers: 


   Asthma severity: severe   Asthma persistence: persistent   Asthma 

complication type: with status asthmaticus   Qualified Code(s): J45.52 - Severe 

persistent asthma with status asthmaticus   





(4) Skin abrasion


SNOMED Code(s): 036888584, 280082827, 694353861


   ICD Code: T14.8XXA - OTHER INJURY OF UNSPECIFIED BODY REGION, INITIAL 

ENCOUNTER   Status: Acute   Current Visit: Yes   Problem Details: Patient 

admitted with wounds on the R leg from a workplace injury that are mostly healed

   


Problem List Initiated/Reviewed/Updated: Yes


Orders Last 24hrs: 


                               Active Orders 24 hr











 Category Date Time Status


 


 Patient Status Manage Transfer [TRANSFER] Routine ADT  07/06/20 00:34 Ordered


 


 Patient Status [ADT] Routine ADT  07/06/20 00:17 Active


 


 Cardiac Monitoring [RC] CONTINUOUS Care  07/06/20 00:19 Active


 


 Oxygen Therapy [RC] PRN Care  07/06/20 00:17 Active


 


 Pulse Oximetry [RC] CONTINUOUS Care  07/06/20 00:19 Active


 


 RT Aerosol Therapy [RC] ASDIRECTED Care  07/05/20 23:25 Active


 


 RT Aerosol Therapy [RC] ASDIRECTED Care  07/05/20 23:38 Active


 


 RT Aerosol Therapy [RC] ASDIRECTED Care  07/06/20 00:04 Active


 


 RT Aerosol Therapy [RC] ASDIRECTED Care  07/06/20 00:22 Active


 


 RT BiPAP/CPAP [RC] ASDIRECTED Care  07/06/20 00:26 Active


 


 VTE/DVT Education [RC] Per Unit Routine Care  07/06/20 00:17 Active


 


 Vital Signs [RC] Q4H Care  07/06/20 00:17 Active


 


 Nothing per Oral Now Diet [DIET] Diet  07/06/20 Breakfast Active


 


 Chest 1V Frontal [CR] Stat Exams  07/05/20 23:53 Taken


 


 BLOOD GAS ARTERIAL [BG] Routine Lab  07/06/20 07:00 Ordered


 


 CBC WITH AUTO DIFF [HEME] Routine Lab  07/06/20 07:00 Ordered


 


 COMPREHENSIVE METABOLIC PN,CMP [CHEM] Routine Lab  07/06/20 07:00 Ordered


 


 CORONAVIRUS COVID-19, MOOKIE Stat Lab  07/06/20 00:09 Ordered


 


 MAGNESIUM [CHEM] Routine Lab  07/06/20 07:00 Ordered


 


 Acetaminophen [Tylenol] Med  07/06/20 00:17 Ordered





 650 mg PO Q4H PRN   


 


 Acetaminophen/HYDROcodone [Norco 325-5 MG] Med  07/06/20 00:17 Ordered





 1 tab PO Q4H PRN   


 


 Albuterol [Proventil Neb Soln] Med  07/06/20 00:17 Ordered





 2.5 mg NEB Q2H PRN   


 


 Albuterol/Ipratropium [DuoNeb 3.0-0.5 MG/3 ML] Med  07/06/20 00:30 Ordered





 3 ml NEB Q4H   


 


 Azithromycin [Zithromax] 500 mg Med  07/06/20 00:45 Ordered





 Sodium Chloride 0.9% [Normal Saline] 250 ml   





 IV Q24H   


 


 Budesonide [Pulmicort] Med  07/06/20 07:00 Ordered





 0.5 mg NEB BIDRT   


 


 Dextrose 5%-0.45% NaCl [Dextrose 5%-1/2 NS] 1,000 ml Med  07/06/20 00:30 

Ordered





 IV ASDIRECTED   


 


 Enoxaparin [Lovenox] Med  07/06/20 09:00 Ordered





 30 mg SUBCUT DAILY   


 


 LORazepam [Ativan] Med  07/06/20 00:31 Ordered





 1 mg IVPUSH Q8H PRN   


 


 Magnesium Sulfate/Water [Magnesium Sulfate in Water Med  07/06/20 00:17 Active





 Premix] 2 gm   





 Premix Bag 1 bag   





 IV ONETIME   


 


 Morphine Sulfate [Morphine] Med  07/06/20 00:17 Ordered





 2 mg IVPUSH Q2H PRN   


 


 Ondansetron [Zofran ODT] Med  07/06/20 00:17 Ordered





 4 mg PO Q8H PRN   


 


 Ondansetron [Zofran] Med  07/06/20 00:17 Ordered





 4 mg IV Q4H PRN   


 


 Promethazine [Phenergan] 12.5 mg Med  07/06/20 00:17 Ordered





 Sodium Chloride 0.9% [Normal Saline] 50 ml   





 IV Q6H   


 


 Sodium Chloride 0.9% Med  07/05/20 23:38 Active





 3 ml INH ASDIRECTED PRN   


 


 methylPREDNISolone Sod Succ [Solu-MEDROL] 60 mg Med  07/06/20 06:30 Ordered





 Dextrose 5% in Water 100 ml   





 IV Q6H   


 


 Resuscitation Status Routine Resus Stat  07/06/20 00:17 Ordered








                                Medication Orders





Acetaminophen (Tylenol)  650 mg PO Q4H PRN


   PRN Reason: Pain (Mild 1-3)/fever


Hydrocodone Bitart/Acetaminophen (Norco 325-5 Mg)  1 tab PO Q4H PRN


   PRN Reason: Pain (moderate 4-6)


Albuterol (Proventil Neb Soln)  2.5 mg NEB Q2H PRN


   PRN Reason: Shortness Of Breath/wheezing


Albuterol/Ipratropium (Duoneb 3.0-0.5 Mg/3 Ml)  3 ml NEB Q4H [facility]


Budesonide (Pulmicort)  0.5 mg NEB BIDRT [facility]


Enoxaparin Sodium (Lovenox)  30 mg SUBCUT DAILY [facility]


Magnesium Sulfate 2 gm/ Premix  50 mls @ 12.5 mls/hr IV ONETIME ONE


   Stop: 07/06/20 04:16


   Last Admin: 07/06/20 00:29  Dose: 12.5 mls/hr


   Documented by: NGUYỄN


Dextrose/Sodium Chloride (Dextrose 5%-1/2 Ns)  1,000 mls @ 125 mls/hr IV 

ASDIRECTED [facility]


   Last Admin: 07/06/20 00:38  Dose: 125 mls/hr


   Documented by: NGUYỄN


Promethazine HCl 12.5 mg/ (Sodium Chloride)  50.5 mls @ 200 mls/hr IV Q6H PRN


   PRN Reason: Nausea/Vomiting


Methylprednisolone Sodium Succinate 60 mg/ Dextrose/Water  100.96 mls @ 200 

mls/hr IV Q6H [facility]


Azithromycin 500 mg/ Sodium (Chloride)  250 mls @ 250 mls/hr IV Q24H [facility]


Lorazepam (Ativan)  1 mg IVPUSH Q8H PRN


   PRN Reason: Agitation


Morphine Sulfate (Morphine)  2 mg IVPUSH Q2H PRN


   PRN Reason: Pain (severe 7-10)


Ondansetron HCl (Zofran Odt)  4 mg PO Q8H PRN


   PRN Reason: Nausea able to take PO


Ondansetron HCl (Zofran)  4 mg IV Q4H PRN


   PRN Reason: Nausea/Vomiting


Sodium Chloride (Sodium Chloride 0.9%)  3 ml INH ASDIRECTED PRN


   PRN Reason: mix with racepinephrine neb


   Last Admin: 07/05/20 23:47  Dose: 3 ml


   Documented by: NGUYỄN











- Mortality Measure


Prognosis:: Good

## 2020-07-07 RX ADMIN — BUDESONIDE SCH MG: 0.5 SUSPENSION RESPIRATORY (INHALATION) at 06:58

## 2020-07-07 NOTE — PCM.DCSUM1
**Discharge Summary





- Hospital Course


Brief History: 55-year-old male with history of tobacco dependence currently 

abstaining and longstanding asthma who presented with acute onset of shortness 

of breath with dyspnea.  He was admitted for management of an asthma 

exacerbation.


Diagnosis: Stroke: No





- Discharge Data


Discharge Date: 07/07/20


Discharge Disposition: Home, Self-Care 01


Condition: Good





- Referral to Home Health


Primary Care Physician: 


PCP None








- Patient Summary/Data


Hospital Course: 





Chin presented to the emergency room with fairly acute onset shortness of breath

and wheezing.  Work-up in the emergency room revealed hypoxic respiratory 

failure secondary to a recurrent exacerbation of his asthma.  He improved only 

slightly with therapy provided in the emergency room.  He was admitted to the 

intensive care unit and did require noninvasive ventilation overnight following 

admission.  He initially received antibiotics as well as IV steroids.  The 

morning after admission he was doing a fair amount better with only some 

residual wheezing.  His CRP and procalcitonin were undetectable so I 

discontinued antibiotics.  I suspect that he had an environmental trigger for 

his exacerbation.  We continued the steroids but did transition them to 

prednisone the day after admission.  He has remained stable.  There is no 

ongoing wheezing.  No fevers.  No significant sputum production.  He feels much 

better at this time.  He was interested in having a nebulizer at home since the 

metered-dose inhaler did not provide significant benefit for his significant 

shortness of breath that prompted the hospital presentation.  Fortunately he has

continued to abstain from cigarettes since his last hospital admission.  He will

be following up with a primary care provider in the near future.  He did receive

a prescription for both albuterol nebulizers as well as prednisone to finish his

5-day burst.





- Patient Instructions


Diet: Regular Diet as Tolerated


Activity: As Tolerated


Driving: [address] Today


Showering/Bathing: May Shower


Notify Provider of: Fever


Other/Special Instructions: 1. You were in the hospital for management of an 

acute exacerbation of asthma that I suspect was caused by an environmental 

irritant.  There is no evidence for bacterial infection at this time.  Your 

condition has been improving with cares provided in the hospital.  I do 

recommend that you continue to take steroids for the next 3 1/2 days after 

hospital discharge.  Please take prednisone 20 mg twice daily breakfast and 

supper for 7 doses. Your first dose will be due this evening.  2. I have 

included a prescription for both albuterol nebulizer solution and a nebulizer 

machine.  You should use the nebulizer 4 times daily as needed for shortness of 

breath/wheezing.  3. Please follow-up as scheduled with Dr. Harriet Paez





- Discharge Plan


*PRESCRIPTION DRUG MONITORING PROGRAM REVIEWED*: Not Applicable


*COPY OF PRESCRIPTION DRUG MONITORING REPORT IN PATIENT JOSE ALFREDO: Not Applicable


Prescriptions/Med Rec: 


predniSONE 20 mg PO BIDMEALS #7 tablet


Albuterol [Proventil Neb Soln] 2.5 mg NEB QID PRN #120 neb


 PRN Reason: Shortness Of Breath/wheezing


Home Medications: 


                                    Home Meds





Albuterol [Ventolin HFA] 1 - 2 puff .XX Q4H PRN 06/22/20 [History]


Albuterol [Proventil Neb Soln] 2.5 mg NEB QID PRN #120 neb 07/07/20 [Rx]


predniSONE 20 mg PO BIDMEALS #7 tablet 07/07/20 [Rx]








Oxygen Therapy Mode: Room Air


Patient Handouts:  Albuterol inhalation aerosol, Asthma Attack Prevention, Adult


Referrals: 


Harriet Paez DO [Physician] - 07/15/20 1:40 pm (Please arrive 15 minutes early 

to register for your appointment.  )





- Discharge Summary/Plan Comment


DC Time >30 min.: No





- Patient Data


Vitals - Most Recent: 


                                Last Vital Signs











Temp  36.2 C   07/07/20 08:00


 


Pulse  71   07/07/20 08:00


 


Resp  16   07/07/20 06:00


 


BP  112/67   07/07/20 06:00


 


Pulse Ox  97   07/07/20 06:00











Weight - Most Recent: 63.548 kg


I&O - Last 24 hours: 


                                 Intake & Output











 07/06/20 07/07/20 07/07/20





 22:59 06:59 14:59


 


Intake Total  200 


 


Output Total  850 


 


Balance  -650 











Med Orders - Current: 


                               Current Medications





Acetaminophen (Tylenol)  650 mg PO Q4H PRN


   PRN Reason: Pain (Mild 1-3)/fever


Hydrocodone Bitart/Acetaminophen (Norco 325-5 Mg)  1 tab PO Q4H PRN


   PRN Reason: Pain (moderate 4-6)


Albuterol (Proventil Neb Soln)  2.5 mg NEB Q2H PRN


   PRN Reason: Shortness Of Breath/wheezing


Albuterol/Ipratropium (Duoneb 3.0-0.5 Mg/3 Ml)  3 ml NEB QIDRT [facility]


   Last Admin: 07/07/20 06:58 Dose:  3 ml


   Documented by: 


Budesonide (Pulmicort)  0.5 mg NEB BIDRT [facility]


   Last Admin: 07/07/20 06:58 Dose:  0.5 mg


   Documented by: 


Enoxaparin Sodium (Lovenox)  40 mg SUBCUT DAILY [facility]


   Last Admin: 07/07/20 09:33 Dose:  Not Given


   Documented by: 


Promethazine HCl 12.5 mg/ (Sodium Chloride)  50.5 mls @ 200 mls/hr IV Q6H PRN


   PRN Reason: Nausea/Vomiting


Lorazepam (Ativan)  1 mg IVPUSH Q4H PRN


   PRN Reason: Anxiety


Morphine Sulfate (Morphine)  2 mg IVPUSH Q2H PRN


   PRN Reason: Pain (severe 7-10)


Ondansetron HCl (Zofran Odt)  4 mg PO Q8H PRN


   PRN Reason: Nausea able to take PO


Ondansetron HCl (Zofran)  4 mg IV Q4H PRN


   PRN Reason: Nausea/Vomiting


Prednisone (Prednisone)  20 mg PO BIDMEALS [facility]


   Last Admin: 07/06/20 17:34 Dose:  20 mg


   Documented by: 





Discontinued Medications





Albuterol (Proventil Neb Soln)  2.5 mg NEB ONETIME ONE


   Stop: 07/06/20 00:05


   Last Admin: 07/06/20 00:10 Dose:  2.5 mg


   Documented by: 


Albuterol/Ipratropium (Duoneb 3.0-0.5 Mg/3 Ml)  3 ml NEB ONETIME ONE


   Stop: 07/05/20 23:26


   Last Admin: 07/05/20 23:28 Dose:  3 ml


   Documented by: 


Albuterol/Ipratropium (Duoneb 3.0-0.5 Mg/3 Ml)  3 ml NEB Q4H [facility]


   Last Admin: 07/06/20 03:56 Dose:  3 ml


   Documented by: 


Albuterol/Ipratropium (Duoneb 3.0-0.5 Mg/3 Ml)  3 ml NEB Q4H [facility]


   Last Admin: 07/06/20 07:02 Dose:  3 ml


   Documented by: 


Azithromycin (Zithromax)  500 mg PO BEDTIME [facility]


Epinephrine HCl (Adrenalin)  0.3 mg SUBCUT ONETIME ONE


   Stop: 07/05/20 23:39


   Last Admin: 07/05/20 23:47 Dose:  0.3 mg


   Documented by: 


Magnesium Sulfate 2 gm/ Premix  50 mls @ 12.5 mls/hr IV ONETIME ONE


   Stop: 07/06/20 04:16


   Last Admin: 07/06/20 00:29 Dose:  12.5 mls/hr


   Documented by: 


Dextrose/Sodium Chloride (Dextrose 5%-1/2 Ns)  1,000 mls @ 125 mls/hr IV 

ASDIRECTED [facility]


   Last Admin: 07/06/20 00:38 Dose:  125 mls/hr


   Documented by: 


Azithromycin 500 mg/ Sodium (Chloride)  250 mls @ 250 mls/hr IV Q24H [facility]


   Last Admin: 07/06/20 01:40 Dose:  250 mls/hr


   Documented by: 


Azithromycin 500 mg/ Sodium (Chloride)  250 mls @ 250 mls/hr IV Q24H [facility]


Ceftriaxone Sodium 2 gm/ (Sodium Chloride)  50 mls @ 100 mls/hr IV Q24H [facility]


   Last Admin: 07/06/20 08:37 Dose:  100 mls/hr


   Documented by: 


Lorazepam (Ativan)  1 mg IVPUSH Q8H PRN


   PRN Reason: Agitation


Methylprednisolone Sodium Succinate (Solu-Medrol)  125 mg IVPUSH ONETIME ONE


   Stop: 07/05/20 23:26


   Last Admin: 07/05/20 23:36 Dose:  125 mg


   Documented by: 


Methylprednisolone Sodium Succinate (Solu-Medrol)  60 mg IVPUSH Q6H [facility]


   Last Admin: 07/06/20 05:54 Dose:  60 mg


   Documented by: 


Methylprednisolone Sodium Succinate (Solu-Medrol)  62.5 mg IVPUSH Q8H [facility]


Racepinephrine (S-2 2.25%)  0.5 ml NEB ONETIME ONE


   Stop: 07/05/20 23:39


   Last Admin: 07/05/20 23:47 Dose:  0.5 ml


   Documented by: 


Sodium Chloride (Sodium Chloride 0.9%)  3 ml INH ASDIRECTED PRN


   PRN Reason: mix with racepinephrine neb


   Last Admin: 07/05/20 23:47 Dose:  3 ml


   Documented by:

## 2020-07-07 NOTE — CR
CHEST: Portable 7/6/2020 at 12:04 AM

 

CLINICAL HISTORY:SOB

 

COMPARISON:June 2020

 

FINDINGS:  Lungs are hyperaerated. No infiltrate effusion or pneumothorax is

seen.

 

Impression: Emphysematous changes

 

No acute cardio pelvic process.